# Patient Record
Sex: MALE | Race: WHITE | NOT HISPANIC OR LATINO | URBAN - METROPOLITAN AREA
[De-identification: names, ages, dates, MRNs, and addresses within clinical notes are randomized per-mention and may not be internally consistent; named-entity substitution may affect disease eponyms.]

---

## 2017-05-30 ENCOUNTER — OUTPATIENT (OUTPATIENT)
Dept: OUTPATIENT SERVICES | Facility: HOSPITAL | Age: 52
LOS: 1 days | End: 2017-05-30
Payer: COMMERCIAL

## 2017-05-30 ENCOUNTER — APPOINTMENT (OUTPATIENT)
Dept: RADIOLOGY | Facility: HOSPITAL | Age: 52
End: 2017-05-30

## 2017-05-30 PROCEDURE — 71046 X-RAY EXAM CHEST 2 VIEWS: CPT

## 2017-08-20 ENCOUNTER — EMERGENCY (EMERGENCY)
Facility: HOSPITAL | Age: 52
LOS: 1 days | Discharge: HOME | End: 2017-08-20
Admitting: EMERGENCY MEDICINE
Payer: COMMERCIAL

## 2017-08-20 PROCEDURE — 99283 EMERGENCY DEPT VISIT LOW MDM: CPT

## 2017-08-20 PROCEDURE — 73630 X-RAY EXAM OF FOOT: CPT | Mod: 26,LT

## 2017-08-20 PROCEDURE — 73630 X-RAY EXAM OF FOOT: CPT

## 2017-08-20 PROCEDURE — 99283 EMERGENCY DEPT VISIT LOW MDM: CPT | Mod: 25

## 2018-05-12 ENCOUNTER — EMERGENCY (EMERGENCY)
Facility: HOSPITAL | Age: 53
LOS: 1 days | Discharge: HOME | End: 2018-05-12
Admitting: EMERGENCY MEDICINE
Payer: COMMERCIAL

## 2018-05-12 PROCEDURE — 99283 EMERGENCY DEPT VISIT LOW MDM: CPT

## 2018-05-12 PROCEDURE — 96372 THER/PROPH/DIAG INJ SC/IM: CPT

## 2018-05-12 PROCEDURE — 72110 X-RAY EXAM L-2 SPINE 4/>VWS: CPT | Mod: 26

## 2018-05-12 PROCEDURE — 72110 X-RAY EXAM L-2 SPINE 4/>VWS: CPT

## 2018-05-12 PROCEDURE — 99283 EMERGENCY DEPT VISIT LOW MDM: CPT | Mod: 25

## 2018-05-12 PROCEDURE — 81003 URINALYSIS AUTO W/O SCOPE: CPT

## 2018-06-18 ENCOUNTER — APPOINTMENT (OUTPATIENT)
Dept: MRI IMAGING | Facility: HOSPITAL | Age: 53
End: 2018-06-18
Payer: COMMERCIAL

## 2018-06-18 ENCOUNTER — OUTPATIENT (OUTPATIENT)
Dept: OUTPATIENT SERVICES | Facility: HOSPITAL | Age: 53
LOS: 1 days | End: 2018-06-18
Payer: COMMERCIAL

## 2018-06-18 DIAGNOSIS — Z00.8 ENCOUNTER FOR OTHER GENERAL EXAMINATION: ICD-10-CM

## 2018-06-18 PROCEDURE — 70553 MRI BRAIN STEM W/O & W/DYE: CPT

## 2018-06-18 PROCEDURE — 70553 MRI BRAIN STEM W/O & W/DYE: CPT | Mod: 26

## 2018-06-18 PROCEDURE — A9579: CPT

## 2018-07-03 ENCOUNTER — APPOINTMENT (OUTPATIENT)
Dept: CARDIOLOGY | Facility: CLINIC | Age: 53
End: 2018-07-03
Payer: COMMERCIAL

## 2018-07-03 ENCOUNTER — NON-APPOINTMENT (OUTPATIENT)
Age: 53
End: 2018-07-03

## 2018-07-03 VITALS
HEIGHT: 77 IN | BODY MASS INDEX: 37.19 KG/M2 | OXYGEN SATURATION: 98 % | SYSTOLIC BLOOD PRESSURE: 130 MMHG | WEIGHT: 315 LBS | DIASTOLIC BLOOD PRESSURE: 85 MMHG | HEART RATE: 58 BPM | RESPIRATION RATE: 16 BRPM

## 2018-07-03 VITALS — DIASTOLIC BLOOD PRESSURE: 76 MMHG | SYSTOLIC BLOOD PRESSURE: 110 MMHG | HEART RATE: 64 BPM

## 2018-07-03 DIAGNOSIS — Z78.9 OTHER SPECIFIED HEALTH STATUS: ICD-10-CM

## 2018-07-03 PROCEDURE — 99244 OFF/OP CNSLTJ NEW/EST MOD 40: CPT

## 2018-07-03 PROCEDURE — 93000 ELECTROCARDIOGRAM COMPLETE: CPT

## 2018-07-03 PROCEDURE — 93306 TTE W/DOPPLER COMPLETE: CPT

## 2018-07-18 ENCOUNTER — FORM ENCOUNTER (OUTPATIENT)
Age: 53
End: 2018-07-18

## 2018-07-19 ENCOUNTER — OUTPATIENT (OUTPATIENT)
Dept: OUTPATIENT SERVICES | Facility: HOSPITAL | Age: 53
LOS: 1 days | End: 2018-07-19
Payer: COMMERCIAL

## 2018-07-19 ENCOUNTER — APPOINTMENT (OUTPATIENT)
Dept: CARDIOLOGY | Facility: CLINIC | Age: 53
End: 2018-07-19

## 2018-07-19 DIAGNOSIS — R07.9 CHEST PAIN, UNSPECIFIED: ICD-10-CM

## 2018-07-19 DIAGNOSIS — I49.3 VENTRICULAR PREMATURE DEPOLARIZATION: ICD-10-CM

## 2018-07-19 PROCEDURE — 75574 CT ANGIO HRT W/3D IMAGE: CPT

## 2018-07-19 PROCEDURE — 75574 CT ANGIO HRT W/3D IMAGE: CPT | Mod: 26

## 2019-05-06 ENCOUNTER — OUTPATIENT (OUTPATIENT)
Dept: OUTPATIENT SERVICES | Facility: HOSPITAL | Age: 54
LOS: 1 days | End: 2019-05-06
Payer: COMMERCIAL

## 2019-05-06 ENCOUNTER — APPOINTMENT (OUTPATIENT)
Dept: ULTRASOUND IMAGING | Facility: HOSPITAL | Age: 54
End: 2019-05-06
Payer: COMMERCIAL

## 2019-05-06 DIAGNOSIS — Z00.8 ENCOUNTER FOR OTHER GENERAL EXAMINATION: ICD-10-CM

## 2019-05-06 PROCEDURE — 76705 ECHO EXAM OF ABDOMEN: CPT

## 2019-05-06 PROCEDURE — 76705 ECHO EXAM OF ABDOMEN: CPT | Mod: 26

## 2019-05-10 ENCOUNTER — APPOINTMENT (OUTPATIENT)
Dept: CT IMAGING | Facility: HOSPITAL | Age: 54
End: 2019-05-10

## 2019-05-24 ENCOUNTER — APPOINTMENT (OUTPATIENT)
Dept: RADIOLOGY | Facility: HOSPITAL | Age: 54
End: 2019-05-24

## 2020-11-23 ENCOUNTER — APPOINTMENT (OUTPATIENT)
Dept: SURGERY | Facility: CLINIC | Age: 55
End: 2020-11-23
Payer: COMMERCIAL

## 2020-11-23 VITALS — WEIGHT: 300 LBS | HEIGHT: 77 IN | BODY MASS INDEX: 35.42 KG/M2

## 2020-11-23 PROCEDURE — 99203 OFFICE O/P NEW LOW 30 MIN: CPT

## 2020-11-23 NOTE — ASSESSMENT
[FreeTextEntry1] : Long discussion regarding all options and risks\par Scheduled for surgical repair on 12/3/20

## 2020-11-23 NOTE — PHYSICAL EXAM
[Normal Breath Sounds] : Normal breath sounds [Normal Heart Sounds] : normal heart sounds [Normal Rate and Rhythm] : normal rate and rhythm [Abdominal Masses] : No abdominal masses [Abdomen Tenderness] : ~T ~M No abdominal tenderness [No Rash or Lesion] : No rash or lesion [de-identified] : nl [de-identified] : nl [de-identified] : nonreducible supraumbilical hernia - 10cm. [de-identified] : nl

## 2020-11-23 NOTE — HISTORY OF PRESENT ILLNESS
[de-identified] : This 55 year old man developed a bulge in the upper abdomen eighteen months ago after coughing and vomiting for several days. The bulge has not changed in size and is only occasionally uncomfortable. He denies fever or chills. The patient has been eating well and passing stool.

## 2020-11-23 NOTE — REVIEW OF SYSTEMS
[Heart Rate Is Slow] : the heart rate was not slow [Chest Pain] : no chest pain [Shortness Of Breath] : no shortness of breath [Negative] : Genitourinary

## 2020-11-30 ENCOUNTER — OUTPATIENT (OUTPATIENT)
Dept: OUTPATIENT SERVICES | Facility: HOSPITAL | Age: 55
LOS: 1 days | End: 2020-11-30
Payer: COMMERCIAL

## 2020-11-30 VITALS
DIASTOLIC BLOOD PRESSURE: 75 MMHG | HEIGHT: 76 IN | RESPIRATION RATE: 20 BRPM | SYSTOLIC BLOOD PRESSURE: 124 MMHG | TEMPERATURE: 98 F | WEIGHT: 315 LBS | HEART RATE: 57 BPM | OXYGEN SATURATION: 96 %

## 2020-11-30 DIAGNOSIS — Z01.818 ENCOUNTER FOR OTHER PREPROCEDURAL EXAMINATION: ICD-10-CM

## 2020-11-30 DIAGNOSIS — Z98.890 OTHER SPECIFIED POSTPROCEDURAL STATES: Chronic | ICD-10-CM

## 2020-11-30 DIAGNOSIS — K43.9 VENTRAL HERNIA WITHOUT OBSTRUCTION OR GANGRENE: ICD-10-CM

## 2020-11-30 LAB
ANION GAP SERPL CALC-SCNC: 8 MMOL/L — SIGNIFICANT CHANGE UP (ref 5–17)
BUN SERPL-MCNC: 17 MG/DL — SIGNIFICANT CHANGE UP (ref 7–23)
CALCIUM SERPL-MCNC: 8.8 MG/DL — SIGNIFICANT CHANGE UP (ref 8.4–10.5)
CHLORIDE SERPL-SCNC: 105 MMOL/L — SIGNIFICANT CHANGE UP (ref 96–108)
CO2 SERPL-SCNC: 27 MMOL/L — SIGNIFICANT CHANGE UP (ref 22–31)
CREAT SERPL-MCNC: 1.3 MG/DL — SIGNIFICANT CHANGE UP (ref 0.5–1.3)
GLUCOSE SERPL-MCNC: 107 MG/DL — HIGH (ref 70–99)
POTASSIUM SERPL-MCNC: 4.2 MMOL/L — SIGNIFICANT CHANGE UP (ref 3.5–5.3)
POTASSIUM SERPL-SCNC: 4.2 MMOL/L — SIGNIFICANT CHANGE UP (ref 3.5–5.3)
SARS-COV-2 RNA SPEC QL NAA+PROBE: SIGNIFICANT CHANGE UP
SODIUM SERPL-SCNC: 140 MMOL/L — SIGNIFICANT CHANGE UP (ref 135–145)

## 2020-11-30 PROCEDURE — 93005 ELECTROCARDIOGRAM TRACING: CPT

## 2020-11-30 PROCEDURE — 93010 ELECTROCARDIOGRAM REPORT: CPT | Mod: NC

## 2020-11-30 PROCEDURE — G0463: CPT

## 2020-11-30 PROCEDURE — U0003: CPT

## 2020-11-30 PROCEDURE — 80048 BASIC METABOLIC PNL TOTAL CA: CPT

## 2020-11-30 PROCEDURE — 36415 COLL VENOUS BLD VENIPUNCTURE: CPT

## 2020-11-30 NOTE — H&P PST ADULT - NSICDXPASTMEDICALHX_GEN_ALL_CORE_FT
PAST MEDICAL HISTORY:  At risk for sleep apnea     Class 2 obesity with body mass index (BMI) of 38.0 to 38.9 in adult     Insomnia     Tinnitus      PAST MEDICAL HISTORY:  At risk for sleep apnea     Class 2 obesity with body mass index (BMI) of 38.0 to 38.9 in adult     GERD (gastroesophageal reflux disease)     Insomnia     Tinnitus     Ventral hernia

## 2020-11-30 NOTE — H&P PST ADULT - NSICDXFAMILYHX_GEN_ALL_CORE_FT
FAMILY HISTORY:  Father  Still living? No  Family history of heart disease, Age at diagnosis: Age Unknown    Mother  Still living? No  Family history of lymphoma, Age at diagnosis: Age Unknown

## 2020-11-30 NOTE — H&P PST ADULT - NEGATIVE ENMT SYMPTOMS
no recurrent cold sores/no abnormal taste sensation/no nasal congestion/no nasal obstruction/no vertigo/no hearing difficulty/no sinus symptoms/no nasal discharge/no post-nasal discharge/no nose bleeds/no gum bleeding/no dry mouth/no throat pain/no dysphagia

## 2020-11-30 NOTE — H&P PST ADULT - ASSESSMENT
56 yo male is scheduled for ventral hernia repair at [facility] on 12/3/2020

## 2020-12-03 ENCOUNTER — OUTPATIENT (OUTPATIENT)
Dept: OUTPATIENT SERVICES | Facility: HOSPITAL | Age: 55
LOS: 1 days | End: 2020-12-03
Payer: COMMERCIAL

## 2020-12-03 VITALS
SYSTOLIC BLOOD PRESSURE: 155 MMHG | DIASTOLIC BLOOD PRESSURE: 90 MMHG | OXYGEN SATURATION: 95 % | RESPIRATION RATE: 16 BRPM | HEART RATE: 56 BPM | TEMPERATURE: 97 F

## 2020-12-03 VITALS
SYSTOLIC BLOOD PRESSURE: 148 MMHG | WEIGHT: 315 LBS | TEMPERATURE: 98 F | HEART RATE: 58 BPM | RESPIRATION RATE: 15 BRPM | HEIGHT: 76 IN | OXYGEN SATURATION: 97 % | DIASTOLIC BLOOD PRESSURE: 84 MMHG

## 2020-12-03 DIAGNOSIS — K43.9 VENTRAL HERNIA WITHOUT OBSTRUCTION OR GANGRENE: ICD-10-CM

## 2020-12-03 DIAGNOSIS — Z98.890 OTHER SPECIFIED POSTPROCEDURAL STATES: Chronic | ICD-10-CM

## 2020-12-03 PROCEDURE — 99215 OFFICE O/P EST HI 40 MIN: CPT

## 2020-12-03 PROCEDURE — 93005 ELECTROCARDIOGRAM TRACING: CPT

## 2020-12-03 PROCEDURE — 93010 ELECTROCARDIOGRAM REPORT: CPT

## 2020-12-03 PROCEDURE — 49560: CPT | Mod: 73

## 2020-12-03 RX ORDER — HYDROMORPHONE HYDROCHLORIDE 2 MG/ML
0.5 INJECTION INTRAMUSCULAR; INTRAVENOUS; SUBCUTANEOUS
Refills: 0 | Status: DISCONTINUED | OUTPATIENT
Start: 2020-12-03 | End: 2020-12-03

## 2020-12-03 RX ORDER — SODIUM CHLORIDE 9 MG/ML
1000 INJECTION, SOLUTION INTRAVENOUS
Refills: 0 | Status: DISCONTINUED | OUTPATIENT
Start: 2020-12-03 | End: 2020-12-03

## 2020-12-03 RX ORDER — ONDANSETRON 8 MG/1
4 TABLET, FILM COATED ORAL ONCE
Refills: 0 | Status: DISCONTINUED | OUTPATIENT
Start: 2020-12-03 | End: 2020-12-03

## 2020-12-03 RX ADMIN — SODIUM CHLORIDE 50 MILLILITER(S): 9 INJECTION, SOLUTION INTRAVENOUS at 09:47

## 2020-12-03 NOTE — CONSULT NOTE ADULT - ATTENDING COMMENTS
vpc's  unclear significance . most likely chronic in nature. however high density now noted with bigeminy. would consider acceleration of cad or even effects of etoh. surgery is elective and deferred and patient is referred to dr teran for formal evaluation.

## 2020-12-03 NOTE — ASU PATIENT PROFILE, ADULT - PMH
At risk for sleep apnea    Class 2 obesity with body mass index (BMI) of 38.0 to 38.9 in adult    GERD (gastroesophageal reflux disease)    Tinnitus    Ventral hernia

## 2020-12-03 NOTE — CONSULT NOTE ADULT - SUBJECTIVE AND OBJECTIVE BOX
Chief Complaint:     54 yo male presents with complaint of ventral hernia  He is scheduled for repair of ventral hernia, possible mesh on 13/3/2020 with Dr Knox at [facility]. Giuseppe was previously seen by dr marcelino and underwent a cardiac CTA demonstrating some lad disease. he was treated medically. on monitor he is noted to have ventricular bigeminy and the ECG shows ventricular coupling. he had some alchoh yesterday.     HPI:    PMH:   Ventral hernia  Insomnia  GERD (gastroesophageal reflux disease)  Class 2 obesity with body mass index (BMI) of 38.0 to 38.9 in adult  At risk for sleep apnea  Insomnia  Tinnitus    PSH:   History of nasal surgery      Family History:  FAMILY HISTORY:  Family history of lymphoma (Mother)    Family history of heart disease (Father)        Social History:  Smoking:  Alcohol:  Drugs:    Allergies:  No Known Allergies      Medications:      REVIEW OF SYSTEMS:  CONSTITUTIONAL: No fever, weight loss, or fatigue  EYES: No eye pain, visual disturbances, or discharge  ENMT:  No difficulty hearing, tinnitus, vertigo; No sinus or throat pain  NECK: No pain or stiffness  BREASTS: No pain, masses, or nipple discharge  RESPIRATORY: No cough, wheezing, chills or hemoptysis; No shortness of breath  CARDIOVASCULAR: No chest pain, palpitations, dizziness, or leg swelling  GASTROINTESTINAL: No abdominal or epigastric pain. No nausea, vomiting, or hematemesis; No diarrhea or constipation. No melena or hematochezia.  GENITOURINARY: No dysuria, frequency, hematuria, or incontinence  NEUROLOGICAL: No headaches, memory loss, loss of strength, numbness, or tremors  SKIN: No itching, burning, rashes, or lesions   LYMPH NODES: No enlarged glands  ENDOCRINE: No heat or cold intolerance; No hair loss  MUSCULOSKELETAL: No joint pain or swelling; No muscle, back, or extremity pain  PSYCHIATRIC: No depression, anxiety, mood swings, or difficulty sleeping  HEME/LYMPH: No easy bruising, or bleeding gums  ALLERY AND IMMUNOLOGIC: No hives or eczema    Physical Exam:  T(C): 36.3 (12-03-20 @ 15:03), Max: 36.8 (12-03-20 @ 14:30)  HR: 56 (12-03-20 @ 15:03) (56 - 84)  BP: 155/90 (12-03-20 @ 15:03) (142/79 - 155/90)  RR: 16 (12-03-20 @ 15:03) (15 - 16)  SpO2: 95% (12-03-20 @ 15:03) (95% - 97%)  Wt(kg): --    GENERAL: NAD, well-groomed, well-developed  HEAD:  Atraumatic, Normocephalic  EYES: EOMI, conjunctiva and sclera clear  ENT: Moist mucous membranes,  NECK: Supple, No JVD, no bruits  CHEST/LUNG: Clear to percussion bilaterally; No rales, rhonchi, wheezing, or rubs  HEART: Regular rate and rhythm; No murmurs, rubs, or gallops PMI non displaced.  ABDOMEN: Soft, Nontender, Nondistended; Bowel sounds present  EXTREMITIES:  2+ Peripheral Pulses, No clubbing, cyanosis, or edema  SKIN: No rashes or lesions  NERVOUS SYSTEM:  Cranial Nerves II-XII intact     Cardiovascular Diagnostic Testing:  ECG:    e< from: 12 Lead ECG (11.30.20 @ 08:59) >  Diagnosis Line Sinus rhythm with frequent and consecutive premature ventricular complexes  polymorphic ventricular beats with ventricular coupling  Abnormal ECG  No previous ECGs available    Confirmed by TAISHA DIAZ MD ([id number]) on 12/1/2020 8:59:57 AM    < end of copied text >      ECHO:        Labs:    Imaging:    cardiac cta 7/19/18 up to .5 diag and lad dz and mild rca disease

## 2020-12-03 NOTE — ASU DISCHARGE PLAN (ADULT/PEDIATRIC) - CARE PROVIDER_API CALL
Matthew Knox  COLON/RECTAL SURGERY  [address], [address]  [place], [place] [id number]  Phone: [phone]  Fax: [phone]  Follow Up Time: 1 month    Alex Taylor  CARDIOLOGY  [address], [address]  [place] [address]  Phone: [phone]  Fax: [phone]  Follow Up Time: 1 week

## 2020-12-03 NOTE — ASU DISCHARGE PLAN (ADULT/PEDIATRIC) - PROVIDER TOKENS
PROVIDER:[TOKEN:[[id number]],FOLLOWUP:[1 month]],PROVIDER:[TOKEN:[[id number]],FOLLOWUP:[1 week]]

## 2020-12-03 NOTE — ASU DISCHARGE PLAN (ADULT/PEDIATRIC) - ASU DC SPECIAL INSTRUCTIONSFT
Follow-up with Cardiology (Dr. Mcghee) in 1-2 weeks; please call office for appointment.  Please call Dr. Knox after Cardiology evaluation and clearance to schedule hernia surgery,

## 2020-12-03 NOTE — ASU PREOP CHECKLIST - LAST TOOK
Impression: Puckering of macula, right eye: H35.371. Lamellar hole  Plan: OCT ordered and performed today. Discussed diagnosis with patient. The clinical exam was consistent with Epiretinal membrane. The diagnosis and natural history and prognosis of Epiretinal membrane, as well as the risks and benefits with Vitrectomy with membrane peeling were discussed. Given the current visual acuity  the patient elects to observe for now. 10:30pm water/solids

## 2020-12-04 PROBLEM — K43.9 VENTRAL HERNIA WITHOUT OBSTRUCTION OR GANGRENE: Chronic | Status: ACTIVE | Noted: 2020-11-30

## 2020-12-04 PROBLEM — E66.9 OBESITY, UNSPECIFIED: Chronic | Status: ACTIVE | Noted: 2020-11-30

## 2020-12-04 PROBLEM — H93.19 TINNITUS, UNSPECIFIED EAR: Chronic | Status: ACTIVE | Noted: 2020-11-30

## 2020-12-04 PROBLEM — Z91.89 OTHER SPECIFIED PERSONAL RISK FACTORS, NOT ELSEWHERE CLASSIFIED: Chronic | Status: ACTIVE | Noted: 2020-11-30

## 2020-12-04 PROBLEM — K21.9 GASTRO-ESOPHAGEAL REFLUX DISEASE WITHOUT ESOPHAGITIS: Chronic | Status: ACTIVE | Noted: 2020-11-30

## 2020-12-08 ENCOUNTER — NON-APPOINTMENT (OUTPATIENT)
Age: 55
End: 2020-12-08

## 2020-12-08 ENCOUNTER — APPOINTMENT (OUTPATIENT)
Dept: CARDIOLOGY | Facility: CLINIC | Age: 55
End: 2020-12-08
Payer: COMMERCIAL

## 2020-12-08 VITALS
HEIGHT: 77 IN | DIASTOLIC BLOOD PRESSURE: 78 MMHG | TEMPERATURE: 97.2 F | RESPIRATION RATE: 126 BRPM | WEIGHT: 315 LBS | OXYGEN SATURATION: 97 % | BODY MASS INDEX: 37.19 KG/M2 | SYSTOLIC BLOOD PRESSURE: 131 MMHG | HEART RATE: 55 BPM

## 2020-12-08 VITALS — HEART RATE: 70 BPM | DIASTOLIC BLOOD PRESSURE: 70 MMHG | SYSTOLIC BLOOD PRESSURE: 110 MMHG

## 2020-12-08 DIAGNOSIS — H93.19 TINNITUS, UNSPECIFIED EAR: ICD-10-CM

## 2020-12-08 PROBLEM — G47.00 INSOMNIA, UNSPECIFIED: Chronic | Status: INACTIVE | Noted: 2020-11-30 | Resolved: 2020-12-03

## 2020-12-08 PROCEDURE — 99072 ADDL SUPL MATRL&STAF TM PHE: CPT

## 2020-12-08 PROCEDURE — 93000 ELECTROCARDIOGRAM COMPLETE: CPT

## 2020-12-08 PROCEDURE — 99215 OFFICE O/P EST HI 40 MIN: CPT

## 2020-12-09 PROBLEM — H93.19 TINNITUS: Status: ACTIVE | Noted: 2018-07-03

## 2020-12-09 NOTE — PHYSICAL EXAM
[General Appearance - Well Developed] : well developed [Normal Appearance] : normal appearance [Well Groomed] : well groomed [General Appearance - Well Nourished] : well nourished [No Deformities] : no deformities [General Appearance - In No Acute Distress] : no acute distress [Normal Oral Mucosa] : normal oral mucosa [No Oral Pallor] : no oral pallor [No Oral Cyanosis] : no oral cyanosis [Normal Jugular Venous A Waves Present] : normal jugular venous A waves present [Normal Jugular Venous V Waves Present] : normal jugular venous V waves present [No Jugular Venous Connor A Waves] : no jugular venous connor A waves [Respiration, Rhythm And Depth] : normal respiratory rhythm and effort [Exaggerated Use Of Accessory Muscles For Inspiration] : no accessory muscle use [Auscultation Breath Sounds / Voice Sounds] : lungs were clear to auscultation bilaterally [Abdomen Soft] : soft [Abdomen Tenderness] : non-tender [Abdomen Mass (___ Cm)] : no abdominal mass palpated [Nail Clubbing] : no clubbing of the fingernails [Cyanosis, Localized] : no localized cyanosis [Petechial Hemorrhages (___cm)] : no petechial hemorrhages [Skin Color & Pigmentation] : normal skin color and pigmentation [] : no rash [No Venous Stasis] : no venous stasis [Skin Lesions] : no skin lesions [No Skin Ulcers] : no skin ulcer [No Xanthoma] : no  xanthoma was observed [Oriented To Time, Place, And Person] : oriented to person, place, and time [Affect] : the affect was normal [Mood] : the mood was normal [No Anxiety] : not feeling anxious [5th Left ICS - MCL] : palpated at the 5th LICS in the midclavicular line [Normal] : normal [Normal Rate] : normal [Premature Beats] : regular with premature beats

## 2020-12-09 NOTE — REASON FOR VISIT
[Follow-Up - Clinic] : a clinic follow-up of [FreeTextEntry1] : Patient returns for reevaluation. He recently went for a ventral hernia surgery. He was taken off the table prior to the surgery due to frequent PVCs. Anesthesia would not allow him to undergo surgery without a repeat cardiac evaluation.His chief complaint is of tinnitus and he is concerned whether or not this may be related to his PVCs. Prior workup included a cardiac CAT scan which revealed nonobstructive disease this was done 2 years ago.

## 2020-12-23 ENCOUNTER — APPOINTMENT (OUTPATIENT)
Dept: CARDIOLOGY | Facility: CLINIC | Age: 55
End: 2020-12-23
Payer: COMMERCIAL

## 2020-12-23 PROCEDURE — 93015 CV STRESS TEST SUPVJ I&R: CPT

## 2020-12-23 PROCEDURE — 99072 ADDL SUPL MATRL&STAF TM PHE: CPT

## 2020-12-23 PROCEDURE — 93306 TTE W/DOPPLER COMPLETE: CPT

## 2021-01-04 ENCOUNTER — OUTPATIENT (OUTPATIENT)
Dept: OUTPATIENT SERVICES | Facility: HOSPITAL | Age: 56
LOS: 1 days | End: 2021-01-04
Payer: COMMERCIAL

## 2021-01-04 VITALS
OXYGEN SATURATION: 98 % | HEIGHT: 75 IN | HEART RATE: 72 BPM | TEMPERATURE: 98 F | RESPIRATION RATE: 16 BRPM | SYSTOLIC BLOOD PRESSURE: 133 MMHG | WEIGHT: 315 LBS | DIASTOLIC BLOOD PRESSURE: 75 MMHG

## 2021-01-04 DIAGNOSIS — Z98.890 OTHER SPECIFIED POSTPROCEDURAL STATES: Chronic | ICD-10-CM

## 2021-01-04 DIAGNOSIS — F32.9 MAJOR DEPRESSIVE DISORDER, SINGLE EPISODE, UNSPECIFIED: ICD-10-CM

## 2021-01-04 DIAGNOSIS — K43.9 VENTRAL HERNIA WITHOUT OBSTRUCTION OR GANGRENE: ICD-10-CM

## 2021-01-04 DIAGNOSIS — E66.9 OBESITY, UNSPECIFIED: ICD-10-CM

## 2021-01-04 DIAGNOSIS — I49.3 VENTRICULAR PREMATURE DEPOLARIZATION: ICD-10-CM

## 2021-01-04 DIAGNOSIS — K21.9 GASTRO-ESOPHAGEAL REFLUX DISEASE WITHOUT ESOPHAGITIS: ICD-10-CM

## 2021-01-04 DIAGNOSIS — Z01.818 ENCOUNTER FOR OTHER PREPROCEDURAL EXAMINATION: ICD-10-CM

## 2021-01-04 LAB — SARS-COV-2 RNA SPEC QL NAA+PROBE: SIGNIFICANT CHANGE UP

## 2021-01-04 PROCEDURE — U0003: CPT

## 2021-01-04 PROCEDURE — U0005: CPT

## 2021-01-04 PROCEDURE — G0463: CPT

## 2021-01-04 PROCEDURE — 36415 COLL VENOUS BLD VENIPUNCTURE: CPT

## 2021-01-04 RX ORDER — ACETAMINOPHEN 500 MG
2 TABLET ORAL
Qty: 0 | Refills: 0 | DISCHARGE

## 2021-01-04 RX ORDER — NIACIN 50 MG
1 TABLET ORAL
Qty: 0 | Refills: 0 | DISCHARGE

## 2021-01-04 RX ORDER — SODIUM CHLORIDE 9 MG/ML
1000 INJECTION, SOLUTION INTRAVENOUS
Refills: 0 | Status: DISCONTINUED | OUTPATIENT
Start: 2021-01-07 | End: 2021-01-21

## 2021-01-04 NOTE — H&P PST ADULT - NSICDXPASTMEDICALHX_GEN_ALL_CORE_FT
PAST MEDICAL HISTORY:  At risk for sleep apnea     Cardiac arrhythmia     Class 2 obesity with body mass index (BMI) of 38.0 to 38.9 in adult     GERD (gastroesophageal reflux disease)     Tinnitus     Ventral hernia      PAST MEDICAL HISTORY:  At risk for sleep apnea     Cardiac arrhythmia     Class 2 obesity with body mass index (BMI) of 38.0 to 38.9 in adult     Frequent PVCs     GERD (gastroesophageal reflux disease)     Tinnitus     Ventral hernia

## 2021-01-04 NOTE — H&P PST ADULT - NSANTHOSAYNRD_GEN_A_CORE
neck 20inches/No. SAMIRA screening performed.  STOP BANG Legend: 0-2 = LOW Risk; 3-4 = INTERMEDIATE Risk; 5-8 = HIGH Risk

## 2021-01-04 NOTE — H&P PST ADULT - NSICDXPROBLEM_GEN_ALL_CORE_FT
PROBLEM DIAGNOSES  Problem: Ventral hernia without obstruction or gangrene  Assessment and Plan: Pre-op instructions given. Pt verbalized understanding  Chlorhexidine wash instructiosn given  Echo, EKg, Stress test and last cardio visit in chart  Pending; Covidpcr results + Cardiology clearance     Problem: Frequent PVCs  Assessment and Plan: noted on EKG    Problem: Obesity  Assessment and Plan: SAMIRA precaution - stop bang 5  BMI 40.8 wt 326.2 OR booking made aware    Problem: GERD (gastroesophageal reflux disease)  Assessment and Plan: Pt instructed to take meds    Problem: Depression  Assessment and Plan: Pt instructed to take meds

## 2021-01-04 NOTE — H&P PST ADULT - NEGATIVE PSYCHIATRIC SYMPTOMS
no suicidal ideation/no anxiety/no insomnia/no memory loss/no paranoia/no mood swings/no agitation/no auditory hallucinations/no hyperactivity

## 2021-01-04 NOTE — H&P PST ADULT - VENOUS THROMBOEMBOLISM BMI
LOC: The patient is awake, alert and aware of environment with an appropriate affect, the patient is oriented x 3 and speaking appropriately.  APPEARANCE: Patient resting comfortably and in no acute distress, patient is clean and well groomed, patient's clothing is properly fastened.  SKIN: The skin is warm and dry, patient has normal skin turgor and moist mucus membranes, skin intact, no breakdown or brusing noted.  MUSKULOSKELETAL: Patient moving all extremities well, no obvious swelling or deformities noted.  RESPIRATORY: Airway is open and patent, respirations are spontaneous, patient has a normal effort and rate. Breath sounds are clear and equal bilaterally.  CARDIAC: Normal heart sounds. No peripheral edema.  ABDOMEN: Soft and non tender to palpation, no distention noted.   NEURO: No neuro deficits, hand grasp equal, no drift noted, no facial droop noted. Speech is clear.    Pt denies fever, night sweats or chills.   
(1) obesity (BMI greater than 25)

## 2021-01-05 ENCOUNTER — NON-APPOINTMENT (OUTPATIENT)
Age: 56
End: 2021-01-05

## 2021-01-05 ENCOUNTER — APPOINTMENT (OUTPATIENT)
Dept: CARDIOLOGY | Facility: CLINIC | Age: 56
End: 2021-01-05
Payer: COMMERCIAL

## 2021-01-05 VITALS
DIASTOLIC BLOOD PRESSURE: 83 MMHG | TEMPERATURE: 97.6 F | SYSTOLIC BLOOD PRESSURE: 125 MMHG | HEIGHT: 77 IN | WEIGHT: 315 LBS | RESPIRATION RATE: 42 BRPM | BODY MASS INDEX: 37.19 KG/M2 | OXYGEN SATURATION: 97 %

## 2021-01-05 VITALS — HEART RATE: 46 BPM | DIASTOLIC BLOOD PRESSURE: 72 MMHG | SYSTOLIC BLOOD PRESSURE: 124 MMHG

## 2021-01-05 PROCEDURE — 93000 ELECTROCARDIOGRAM COMPLETE: CPT

## 2021-01-05 PROCEDURE — 99214 OFFICE O/P EST MOD 30 MIN: CPT

## 2021-01-05 PROCEDURE — 99072 ADDL SUPL MATRL&STAF TM PHE: CPT

## 2021-01-05 NOTE — REASON FOR VISIT
[Consultation] : a consultation regarding [FreeTextEntry1] : Patient presents for cardiac clearance for hernia surgery. He originally was scheduled for several weeks ago but on presentation with frequent PVCs the case was canceled. The patient has an over 30 year history of premature ventricular contractions. They have always been asymptomatic. He denies chest discomfort shortness of breath palpitations dizziness or syncope. On December 23 he underwent exercise stress testing. At the time of presentation he was in bigeminy. However he completed 9 minutes of a Kit protocol with no symptoms and no ST changes indicative of ischemia. This was a normal test. Echocardiography revealed normal left ventricular systolic function. There was no evidence of significant valvular stenosis or insufficiency.

## 2021-01-05 NOTE — ASSESSMENT
[FreeTextEntry1] : In summary, the patient is a 55-year-old man with frequent PVCs including frequent episodes of sustained bigeminy. He has no structural heart disease as demonstrated by a normal echocardiogram and a normal exercise stress test.\par \par Frequent PVCs are to be expected in this patient. He has been placed on beta blocker which he is tolerating well and has been on this since December 23.\par \par There is no cardiac contraindication to the proposed procedure

## 2021-01-06 ENCOUNTER — TRANSCRIPTION ENCOUNTER (OUTPATIENT)
Age: 56
End: 2021-01-06

## 2021-01-07 ENCOUNTER — RESULT REVIEW (OUTPATIENT)
Age: 56
End: 2021-01-07

## 2021-01-07 ENCOUNTER — OUTPATIENT (OUTPATIENT)
Dept: OUTPATIENT SERVICES | Facility: HOSPITAL | Age: 56
LOS: 1 days | End: 2021-01-07
Payer: COMMERCIAL

## 2021-01-07 VITALS
OXYGEN SATURATION: 97 % | WEIGHT: 315 LBS | TEMPERATURE: 97 F | HEART RATE: 64 BPM | HEIGHT: 75 IN | SYSTOLIC BLOOD PRESSURE: 150 MMHG | DIASTOLIC BLOOD PRESSURE: 70 MMHG | RESPIRATION RATE: 16 BRPM

## 2021-01-07 VITALS
HEART RATE: 60 BPM | DIASTOLIC BLOOD PRESSURE: 85 MMHG | TEMPERATURE: 98 F | OXYGEN SATURATION: 96 % | RESPIRATION RATE: 17 BRPM | SYSTOLIC BLOOD PRESSURE: 136 MMHG

## 2021-01-07 DIAGNOSIS — K43.9 VENTRAL HERNIA WITHOUT OBSTRUCTION OR GANGRENE: ICD-10-CM

## 2021-01-07 DIAGNOSIS — Z98.890 OTHER SPECIFIED POSTPROCEDURAL STATES: Chronic | ICD-10-CM

## 2021-01-07 PROCEDURE — 88302 TISSUE EXAM BY PATHOLOGIST: CPT

## 2021-01-07 PROCEDURE — 49561: CPT

## 2021-01-07 PROCEDURE — 88302 TISSUE EXAM BY PATHOLOGIST: CPT | Mod: 26

## 2021-01-07 PROCEDURE — 49585: CPT

## 2021-01-07 PROCEDURE — 49561: CPT | Mod: AS

## 2021-01-07 RX ORDER — OXYCODONE HYDROCHLORIDE 5 MG/1
10 TABLET ORAL ONCE
Refills: 0 | Status: DISCONTINUED | OUTPATIENT
Start: 2021-01-07 | End: 2021-01-07

## 2021-01-07 RX ORDER — HYDROMORPHONE HYDROCHLORIDE 2 MG/ML
0.5 INJECTION INTRAMUSCULAR; INTRAVENOUS; SUBCUTANEOUS
Refills: 0 | Status: DISCONTINUED | OUTPATIENT
Start: 2021-01-07 | End: 2021-01-07

## 2021-01-07 RX ORDER — SODIUM CHLORIDE 9 MG/ML
1000 INJECTION, SOLUTION INTRAVENOUS
Refills: 0 | Status: DISCONTINUED | OUTPATIENT
Start: 2021-01-07 | End: 2021-01-07

## 2021-01-07 RX ORDER — OXYCODONE HYDROCHLORIDE 5 MG/1
1 TABLET ORAL
Qty: 16 | Refills: 0
Start: 2021-01-07 | End: 2021-01-10

## 2021-01-07 RX ADMIN — HYDROMORPHONE HYDROCHLORIDE 0.5 MILLIGRAM(S): 2 INJECTION INTRAMUSCULAR; INTRAVENOUS; SUBCUTANEOUS at 17:30

## 2021-01-07 RX ADMIN — SODIUM CHLORIDE 50 MILLILITER(S): 9 INJECTION, SOLUTION INTRAVENOUS at 10:12

## 2021-01-07 NOTE — ASU DISCHARGE PLAN (ADULT/PEDIATRIC) - ASU DC SPECIAL INSTRUCTIONSFT
Keep dressing dry for 24 hours then may shower over steri strips.  Ice pack for comfort and swelling as needed   no lifting, pushing, pulling  Take oxycodone as prescribed for moderate pain, take only tylenol for mild pain  Take a stool softener or laxative while taking narcotics to avoid constipation   Follow up with Dr Knox in one week call office for appt. Keep dressing dry for 24 hours then may shower over steri strips.  Ice pack for comfort and swelling as needed   no lifting, pushing, pulling  Take oxycodone as prescribed for moderate pain, take only Tylenol for mild pain  Take a stool softener or laxative while taking narcotics to avoid constipation   Follow up with Dr Knox in one week call office for appt.

## 2021-01-07 NOTE — ASU DISCHARGE PLAN (ADULT/PEDIATRIC) - CALL YOUR DOCTOR IF YOU HAVE ANY OF THE FOLLOWING:
Bleeding that does not stop/Pain not relieved by Medications/Fever greater than (need to indicate Fahrenheit or Celsius)/Wound/Surgical Site with redness, or foul smelling discharge or pus/Inability to tolerate liquids or foods

## 2021-01-07 NOTE — ASU DISCHARGE PLAN (ADULT/PEDIATRIC) - CARE PROVIDER_API CALL
Matthew Knox  COLON/RECTAL SURGERY  [address] [facility] [address] [facility] [address] [facility] [facility], [address]  [place], [place] [id number]  Phone: [phone]  Fax: [phone]  Follow Up Time: 1 week

## 2021-01-07 NOTE — ASU PATIENT PROFILE, ADULT - PMH
At risk for sleep apnea    Cardiac arrhythmia    Class 2 obesity with body mass index (BMI) of 38.0 to 38.9 in adult    Frequent PVCs    GERD (gastroesophageal reflux disease)    Tinnitus    Ventral hernia

## 2021-01-11 LAB — SURGICAL PATHOLOGY STUDY: SIGNIFICANT CHANGE UP

## 2021-01-14 PROBLEM — I49.3 VENTRICULAR PREMATURE DEPOLARIZATION: Chronic | Status: ACTIVE | Noted: 2021-01-04

## 2021-01-14 PROBLEM — I49.9 CARDIAC ARRHYTHMIA, UNSPECIFIED: Chronic | Status: ACTIVE | Noted: 2021-01-04

## 2021-01-15 ENCOUNTER — APPOINTMENT (OUTPATIENT)
Dept: SURGERY | Facility: CLINIC | Age: 56
End: 2021-01-15
Payer: COMMERCIAL

## 2021-01-15 DIAGNOSIS — Z82.49 FAMILY HISTORY OF ISCHEMIC HEART DISEASE AND OTHER DISEASES OF THE CIRCULATORY SYSTEM: ICD-10-CM

## 2021-01-15 DIAGNOSIS — K43.9 VENTRAL HERNIA W/OUT OBSTRUCTION OR GANGRENE: ICD-10-CM

## 2021-01-15 DIAGNOSIS — Z80.7 FAMILY HISTORY OF OTHER MALIGNANT NEOPLASMS OF LYMPHOID, HEMATOPOIETIC AND RELATED TISSUES: ICD-10-CM

## 2021-01-15 PROCEDURE — 99024 POSTOP FOLLOW-UP VISIT: CPT

## 2021-01-15 NOTE — PHYSICAL EXAM
[Abdominal Masses] : No abdominal masses [Abdomen Tenderness] : ~T ~M No abdominal tenderness [de-identified] : wound healing well

## 2021-12-05 ENCOUNTER — TRANSCRIPTION ENCOUNTER (OUTPATIENT)
Age: 56
End: 2021-12-05

## 2021-12-24 ENCOUNTER — TRANSCRIPTION ENCOUNTER (OUTPATIENT)
Age: 56
End: 2021-12-24

## 2022-06-16 ENCOUNTER — APPOINTMENT (OUTPATIENT)
Dept: ULTRASOUND IMAGING | Facility: HOSPITAL | Age: 57
End: 2022-06-16

## 2022-08-01 ENCOUNTER — INPATIENT (INPATIENT)
Facility: HOSPITAL | Age: 57
LOS: 0 days | Discharge: HOME | DRG: 308 | End: 2022-08-02
Attending: HOSPITALIST | Admitting: HOSPITALIST
Payer: COMMERCIAL

## 2022-08-01 VITALS
DIASTOLIC BLOOD PRESSURE: 84 MMHG | OXYGEN SATURATION: 98 % | TEMPERATURE: 98 F | RESPIRATION RATE: 20 BRPM | WEIGHT: 315 LBS | SYSTOLIC BLOOD PRESSURE: 153 MMHG | HEIGHT: 77 IN | HEART RATE: 99 BPM

## 2022-08-01 DIAGNOSIS — Z98.890 OTHER SPECIFIED POSTPROCEDURAL STATES: Chronic | ICD-10-CM

## 2022-08-01 DIAGNOSIS — I50.9 HEART FAILURE, UNSPECIFIED: ICD-10-CM

## 2022-08-01 DIAGNOSIS — R07.9 CHEST PAIN, UNSPECIFIED: ICD-10-CM

## 2022-08-01 LAB
ALBUMIN SERPL ELPH-MCNC: 3.9 G/DL — SIGNIFICANT CHANGE UP (ref 3.3–5)
ALP SERPL-CCNC: 69 U/L — SIGNIFICANT CHANGE UP (ref 40–120)
ALT FLD-CCNC: 68 U/L — HIGH (ref 10–45)
ANION GAP SERPL CALC-SCNC: 8 MMOL/L — SIGNIFICANT CHANGE UP (ref 5–17)
AST SERPL-CCNC: 29 U/L — SIGNIFICANT CHANGE UP (ref 10–40)
BASOPHILS # BLD AUTO: 0.08 K/UL — SIGNIFICANT CHANGE UP (ref 0–0.2)
BASOPHILS NFR BLD AUTO: 0.8 % — SIGNIFICANT CHANGE UP (ref 0–2)
BILIRUB SERPL-MCNC: 0.9 MG/DL — SIGNIFICANT CHANGE UP (ref 0.2–1.2)
BUN SERPL-MCNC: 18 MG/DL — SIGNIFICANT CHANGE UP (ref 7–23)
CALCIUM SERPL-MCNC: 9 MG/DL — SIGNIFICANT CHANGE UP (ref 8.4–10.5)
CHLORIDE SERPL-SCNC: 101 MMOL/L — SIGNIFICANT CHANGE UP (ref 96–108)
CO2 SERPL-SCNC: 29 MMOL/L — SIGNIFICANT CHANGE UP (ref 22–31)
CREAT SERPL-MCNC: 1.32 MG/DL — HIGH (ref 0.5–1.3)
D DIMER BLD IA.RAPID-MCNC: 293 NG/ML DDU — HIGH
EGFR: 64 ML/MIN/1.73M2 — SIGNIFICANT CHANGE UP
EOSINOPHIL # BLD AUTO: 0.2 K/UL — SIGNIFICANT CHANGE UP (ref 0–0.5)
EOSINOPHIL NFR BLD AUTO: 2.1 % — SIGNIFICANT CHANGE UP (ref 0–6)
GLUCOSE SERPL-MCNC: 132 MG/DL — HIGH (ref 70–99)
HCT VFR BLD CALC: 49.9 % — SIGNIFICANT CHANGE UP (ref 39–50)
HGB BLD-MCNC: 17.3 G/DL — HIGH (ref 13–17)
IMM GRANULOCYTES NFR BLD AUTO: 0.3 % — SIGNIFICANT CHANGE UP (ref 0–1.5)
LYMPHOCYTES # BLD AUTO: 2.38 K/UL — SIGNIFICANT CHANGE UP (ref 1–3.3)
LYMPHOCYTES # BLD AUTO: 24.5 % — SIGNIFICANT CHANGE UP (ref 13–44)
MAGNESIUM SERPL-MCNC: 1.9 MG/DL — SIGNIFICANT CHANGE UP (ref 1.6–2.6)
MCHC RBC-ENTMCNC: 31.6 PG — SIGNIFICANT CHANGE UP (ref 27–34)
MCHC RBC-ENTMCNC: 34.7 GM/DL — SIGNIFICANT CHANGE UP (ref 32–36)
MCV RBC AUTO: 91.2 FL — SIGNIFICANT CHANGE UP (ref 80–100)
MONOCYTES # BLD AUTO: 0.51 K/UL — SIGNIFICANT CHANGE UP (ref 0–0.9)
MONOCYTES NFR BLD AUTO: 5.2 % — SIGNIFICANT CHANGE UP (ref 2–14)
NEUTROPHILS # BLD AUTO: 6.52 K/UL — SIGNIFICANT CHANGE UP (ref 1.8–7.4)
NEUTROPHILS NFR BLD AUTO: 67.1 % — SIGNIFICANT CHANGE UP (ref 43–77)
NRBC # BLD: 0 /100 WBCS — SIGNIFICANT CHANGE UP (ref 0–0)
NT-PROBNP SERPL-SCNC: 1558 PG/ML — HIGH (ref 0–300)
PLATELET # BLD AUTO: 227 K/UL — SIGNIFICANT CHANGE UP (ref 150–400)
POTASSIUM SERPL-MCNC: 4 MMOL/L — SIGNIFICANT CHANGE UP (ref 3.5–5.3)
POTASSIUM SERPL-SCNC: 4 MMOL/L — SIGNIFICANT CHANGE UP (ref 3.5–5.3)
PROT SERPL-MCNC: 7.2 G/DL — SIGNIFICANT CHANGE UP (ref 6–8.3)
RBC # BLD: 5.47 M/UL — SIGNIFICANT CHANGE UP (ref 4.2–5.8)
RBC # FLD: 13.3 % — SIGNIFICANT CHANGE UP (ref 10.3–14.5)
SARS-COV-2 RNA SPEC QL NAA+PROBE: SIGNIFICANT CHANGE UP
SODIUM SERPL-SCNC: 138 MMOL/L — SIGNIFICANT CHANGE UP (ref 135–145)
TROPONIN I, HIGH SENSITIVITY RESULT: 19.7 NG/L — SIGNIFICANT CHANGE UP
TROPONIN I, HIGH SENSITIVITY RESULT: 22.2 NG/L — SIGNIFICANT CHANGE UP
WBC # BLD: 9.72 K/UL — SIGNIFICANT CHANGE UP (ref 3.8–10.5)
WBC # FLD AUTO: 9.72 K/UL — SIGNIFICANT CHANGE UP (ref 3.8–10.5)

## 2022-08-01 PROCEDURE — 99223 1ST HOSP IP/OBS HIGH 75: CPT

## 2022-08-01 PROCEDURE — 71275 CT ANGIOGRAPHY CHEST: CPT | Mod: 26

## 2022-08-01 PROCEDURE — 99222 1ST HOSP IP/OBS MODERATE 55: CPT

## 2022-08-01 PROCEDURE — 99285 EMERGENCY DEPT VISIT HI MDM: CPT

## 2022-08-01 PROCEDURE — 71045 X-RAY EXAM CHEST 1 VIEW: CPT | Mod: 26

## 2022-08-01 PROCEDURE — 93306 TTE W/DOPPLER COMPLETE: CPT | Mod: 26

## 2022-08-01 PROCEDURE — 93010 ELECTROCARDIOGRAM REPORT: CPT

## 2022-08-01 RX ORDER — ALPRAZOLAM 0.25 MG
0.25 TABLET ORAL
Refills: 0 | Status: DISCONTINUED | OUTPATIENT
Start: 2022-08-01 | End: 2022-08-02

## 2022-08-01 RX ORDER — ALPRAZOLAM 0.25 MG
0.25 TABLET ORAL DAILY
Refills: 0 | Status: DISCONTINUED | OUTPATIENT
Start: 2022-08-01 | End: 2022-08-01

## 2022-08-01 RX ORDER — DILTIAZEM HCL 120 MG
25 CAPSULE, EXT RELEASE 24 HR ORAL ONCE
Refills: 0 | Status: COMPLETED | OUTPATIENT
Start: 2022-08-01 | End: 2022-08-01

## 2022-08-01 RX ORDER — ALPRAZOLAM 0.25 MG
1 TABLET ORAL
Qty: 0 | Refills: 0 | DISCHARGE

## 2022-08-01 RX ORDER — METOPROLOL TARTRATE 50 MG
50 TABLET ORAL
Qty: 0 | Refills: 0 | DISCHARGE

## 2022-08-01 RX ORDER — DILTIAZEM HCL 120 MG
30 CAPSULE, EXT RELEASE 24 HR ORAL ONCE
Refills: 0 | Status: COMPLETED | OUTPATIENT
Start: 2022-08-01 | End: 2022-08-01

## 2022-08-01 RX ORDER — ACETAMINOPHEN 500 MG
2 TABLET ORAL
Qty: 0 | Refills: 0 | DISCHARGE

## 2022-08-01 RX ORDER — LANOLIN ALCOHOL/MO/W.PET/CERES
3 CREAM (GRAM) TOPICAL AT BEDTIME
Refills: 0 | Status: DISCONTINUED | OUTPATIENT
Start: 2022-08-01 | End: 2022-08-02

## 2022-08-01 RX ORDER — ESOMEPRAZOLE MAGNESIUM 40 MG/1
1 CAPSULE, DELAYED RELEASE ORAL
Qty: 0 | Refills: 0 | DISCHARGE

## 2022-08-01 RX ORDER — ACETAMINOPHEN 500 MG
650 TABLET ORAL EVERY 6 HOURS
Refills: 0 | Status: DISCONTINUED | OUTPATIENT
Start: 2022-08-01 | End: 2022-08-02

## 2022-08-01 RX ORDER — METOPROLOL TARTRATE 50 MG
100 TABLET ORAL DAILY
Refills: 0 | Status: DISCONTINUED | OUTPATIENT
Start: 2022-08-01 | End: 2022-08-01

## 2022-08-01 RX ORDER — METOPROLOL TARTRATE 50 MG
50 TABLET ORAL DAILY
Refills: 0 | Status: DISCONTINUED | OUTPATIENT
Start: 2022-08-01 | End: 2022-08-01

## 2022-08-01 RX ORDER — ASPIRIN/CALCIUM CARB/MAGNESIUM 324 MG
324 TABLET ORAL ONCE
Refills: 0 | Status: COMPLETED | OUTPATIENT
Start: 2022-08-01 | End: 2022-08-01

## 2022-08-01 RX ORDER — FUROSEMIDE 40 MG
40 TABLET ORAL ONCE
Refills: 0 | Status: COMPLETED | OUTPATIENT
Start: 2022-08-01 | End: 2022-08-01

## 2022-08-01 RX ORDER — APIXABAN 2.5 MG/1
5 TABLET, FILM COATED ORAL EVERY 12 HOURS
Refills: 0 | Status: DISCONTINUED | OUTPATIENT
Start: 2022-08-01 | End: 2022-08-01

## 2022-08-01 RX ORDER — ESCITALOPRAM OXALATE 10 MG/1
1 TABLET, FILM COATED ORAL
Qty: 0 | Refills: 0 | DISCHARGE

## 2022-08-01 RX ORDER — METOPROLOL TARTRATE 50 MG
100 TABLET ORAL DAILY
Refills: 0 | Status: DISCONTINUED | OUTPATIENT
Start: 2022-08-01 | End: 2022-08-02

## 2022-08-01 RX ORDER — ONDANSETRON 8 MG/1
4 TABLET, FILM COATED ORAL EVERY 8 HOURS
Refills: 0 | Status: DISCONTINUED | OUTPATIENT
Start: 2022-08-01 | End: 2022-08-02

## 2022-08-01 RX ORDER — METOPROLOL TARTRATE 50 MG
25 TABLET ORAL
Qty: 0 | Refills: 0 | DISCHARGE

## 2022-08-01 RX ADMIN — Medication 40 MILLIGRAM(S): at 19:17

## 2022-08-01 RX ADMIN — Medication 30 MILLIGRAM(S): at 08:44

## 2022-08-01 RX ADMIN — Medication 0.25 MILLIGRAM(S): at 11:52

## 2022-08-01 RX ADMIN — Medication 324 MILLIGRAM(S): at 08:38

## 2022-08-01 RX ADMIN — Medication 100 MILLIGRAM(S): at 17:17

## 2022-08-01 RX ADMIN — Medication 40 MILLIGRAM(S): at 08:38

## 2022-08-01 RX ADMIN — Medication 25 MILLIGRAM(S): at 08:33

## 2022-08-01 NOTE — ED ADULT NURSE NOTE - OBJECTIVE STATEMENT
Pt presents to ED from home c/o chest tightness and SOB x8 hours. Pt tachycardic on arrival to room, placed on cardiac monitor. He reports lower extremity swelling for "a few weeks", left > right.

## 2022-08-01 NOTE — H&P ADULT - ASSESSMENT
This is a 57M with hx PVCs, tinnitus and anxiety who presents for increasing MAURO and chest tightness, found to have new onset AF and possible CHF exacerbation    #New onset AF  #AF with RVR  -Patient got Cardizem 25 mg IV in ED + 30 mg PO Cardizem   -Will increase home Lopressor to 100 mg ER (was on this for frequent PVCs)  -Will start Eliquis  -Cardiology consulted - Dr. Castro  -Check TSH, echo  -Normal stress test in 2020    #Possible CHF exacerbation, suspect diastolic - patient had normal EF in 2020  -Repeat echo  -Lasix 40 mg IV x 1 given  -Will give one more dose of IV Lasix this afternoon - and will make further decisions on future Lasix (such as PO) after cardio eval  -Daily weights  -Is and Os    #Morbid obesity  -BMI 42  -exercise regimen as tolerated    #Anxiety in setting of chronic tinnitus  -c/w Xanax  -iSTOP reviewed #[id number]    #DVT ppx: Starting Eliquis  IMPROVE VTE Individual Risk Assessment          RISK                                                          Points  [  ] Previous VTE                                                3  [  ] Thrombophilia                                             2  [  ] Lower limb paralysis                                   2        (unable to hold up >15 seconds)    [  ] Current Cancer                                             2         (within 6 months)  [  ] Immobilization > 24 hrs                              1  [  ] ICU/CCU stay > 24 hours                             1  [  ] Age > 60                                                         1    IMPROVE VTE Score: 0     This is a 57M with hx PVCs, tinnitus and anxiety who presents for increasing MAURO and chest tightness, found to have new onset AF and possible CHF exacerbation    #New onset AF  #AF with RVR  -Patient got Cardizem 25 mg IV in ED + 30 mg PO Cardizem   -Will increase home Lopressor to 100 mg ER (was on this for frequent PVCs)  -Will start Eliquis  -Cardiology consulted - Dr. Castro  -Check TSH, echo  -check CTA chest r/o PE - borderline elevated d-dimer, recent travel, new AF  -Normal stress test in 2020    #Possible CHF exacerbation, suspect diastolic - patient had normal EF in 2020  -Repeat echo  -Lasix 40 mg IV x 1 given  -Will give one more dose of IV Lasix this afternoon - and will make further decisions on future Lasix (such as PO) after cardio eval  -Daily weights  -Is and Os    #Morbid obesity  -BMI 42  -exercise regimen as tolerated    #Anxiety in setting of chronic tinnitus  -c/w Xanax  -iSTOP reviewed #[id number]    #DVT ppx: Starting Eliquis  IMPROVE VTE Individual Risk Assessment          RISK                                                          Points  [  ] Previous VTE                                                3  [  ] Thrombophilia                                             2  [  ] Lower limb paralysis                                   2        (unable to hold up >15 seconds)    [  ] Current Cancer                                             2         (within 6 months)  [  ] Immobilization > 24 hrs                              1  [  ] ICU/CCU stay > 24 hours                             1  [  ] Age > 60                                                         1    IMPROVE VTE Score: 0     This is a 57M with hx PVCs, tinnitus and anxiety who presents for increasing MAURO and chest tightness, found to have new onset AF and possible CHF exacerbation    #New onset AF  #AF with RVR  -Patient got Cardizem 25 mg IV in ED + 30 mg PO Cardizem   -Will increase home Lopressor to 100 mg ER (was on this for frequent PVCs)  -Will start Eliquis  -Cardiology consulted - Dr. Castro  -Check TSH, echo  -check CTA chest r/o PE - borderline elevated d-dimer, recent travel, new AF  -Normal stress test in 2020    #Possible CHF exacerbation, suspect diastolic - patient had normal EF in 2020  -Repeat echo  -Lasix 40 mg IV x 1 given  -Will give one more dose of IV Lasix this afternoon - and will make further decisions on future Lasix (such as PO) after cardio eval  -Daily weights  -Is and Os    #Morbid obesity  -BMI 42  -exercise regimen as tolerated    #Anxiety in setting of chronic tinnitus  -c/w Xanax  -iSTOP reviewed #[id number]    #DVT ppx: Starting Eliquis  IMPROVE VTE Individual Risk Assessment          RISK                                                          Points  [  ] Previous VTE                                                3  [  ] Thrombophilia                                             2  [  ] Lower limb paralysis                                   2        (unable to hold up >15 seconds)    [  ] Current Cancer                                             2         (within 6 months)  [  ] Immobilization > 24 hrs                              1  [  ] ICU/CCU stay > 24 hours                             1  [  ] Age > 60                                                         1    IMPROVE VTE Score: 0    Case d/w patient's wife, Melida, at bedside, [phone]   This is a 57M with hx PVCs, tinnitus and anxiety who presents for increasing MAURO and chest tightness, found to have new onset AF and possible CHF exacerbation    #New onset AF  #AF with RVR  -Patient got Cardizem 25 mg IV in ED + 30 mg PO Cardizem   -Will increase home Lopressor to 100 mg ER (was on this for frequent PVCs)  -Will start Eliquis  -Cardiology consulted - Dr. Castro  -Check TSH, echo  -check CTA chest r/o PE - borderline elevated d-dimer, recent travel, new AF  -Normal stress test in 2020    #Possible CHF exacerbation, suspect diastolic - patient had normal EF in 2020  -Repeat echo  -Lasix 40 mg IV x 1 given  -Will give one more dose of IV Lasix this afternoon - and will make further decisions on future Lasix (such as PO) after cardio eval  -Daily weights  -Is and Os    #Morbid obesity  -BMI 42  -exercise regimen as tolerated    #Chest pain  -Possible MSK from weightlifting  -No longer heaving chest pain  -Repeat troponin x 1  -Echo    #Anxiety in setting of chronic tinnitus  -c/w Xanax  -iSTOP reviewed #[id number]    #DVT ppx: Starting Eliquis  IMPROVE VTE Individual Risk Assessment          RISK                                                          Points  [  ] Previous VTE                                                3  [  ] Thrombophilia                                             2  [  ] Lower limb paralysis                                   2        (unable to hold up >15 seconds)    [  ] Current Cancer                                             2         (within 6 months)  [  ] Immobilization > 24 hrs                              1  [  ] ICU/CCU stay > 24 hours                             1  [  ] Age > 60                                                         1    IMPROVE VTE Score: 0    Case d/w patient's wife, Melida, at bedside, [phone]

## 2022-08-01 NOTE — ED ADULT NURSE NOTE - NSICDXPASTMEDICALHX_GEN_ALL_CORE_FT
PAST MEDICAL HISTORY:  At risk for sleep apnea     Cardiac arrhythmia     Class 2 obesity with body mass index (BMI) of 38.0 to 38.9 in adult     Frequent PVCs     GERD (gastroesophageal reflux disease)     Tinnitus     Ventral hernia

## 2022-08-01 NOTE — CONSULT NOTE ADULT - SUBJECTIVE AND OBJECTIVE BOX
GIUSEPPE MELENDEZ  [id number]      HPI:    Giuseppe Melendez is a 57 year old man with past medical history of PVCs and Anxiety who presents with progressive shortness of breath.      ALLERGIES:  No Known Allergies      PAST MEDICAL & SURGICAL HISTORY:  Tinnitus  Obesity   GERD (gastroesophageal reflux disease)  Ventral hernia  Frequent PVCs  History of nasal surgery      CURRENT MEDICATIONS:  acetaminophen     Tablet .. 650 milliGRAM(s) Oral every 6 hours PRN  ALPRAZolam 0.25 milliGRAM(s) Oral daily  aluminum hydroxide/magnesium hydroxide/simethicone Suspension 30 milliLiter(s) Oral every 4 hours PRN  apixaban 5 milliGRAM(s) Oral every 12 hours  furosemide   Injectable 40 milliGRAM(s) IV Push once  melatonin 3 milliGRAM(s) Oral at bedtime PRN  metoprolol succinate  milliGRAM(s) Oral daily  ondansetron Injectable 4 milliGRAM(s) IV Push every 8 hours PRN        FAMILY HISTORY:  Family history of heart disease (Father)  Family history of lymphoma (Mother)        ROS:  All 10 systems reviewed and positives noted in HPI    OBJECTIVE:    VITAL SIGNS:  Vital Signs Last 24 Hrs  T(C): 36.6 (01 Aug 2022 08:20), Max: 36.6 (01 Aug 2022 08:20)  T(F): 97.8 (01 Aug 2022 08:20), Max: 97.8 (01 Aug 2022 08:20)  HR: 107 (01 Aug 2022 09:15) (95 - 125)  BP: 135/69 (01 Aug 2022 09:15) (135/69 - 159/80)  BP(mean): 86 (01 Aug 2022 09:15) (86 - 129)  RR: 19 (01 Aug 2022 09:15) (18 - 20)  SpO2: 97% (01 Aug 2022 09:15) (97% - 99%)    Parameters below as of 01 Aug 2022 09:15  Patient On (Oxygen Delivery Method): room air        PHYSICAL EXAM:  General: well appearing, no distress  HEENT: sclera anicteric  Neck: supple, no carotid bruits b/l  CVS: JVP ~ 7 cm H20, RRR, s1, s2, no murmurs/rubs/gallops  Chest: unlabored respirations, clear to auscultation b/l  Abdomen: non-distended  Extremities: no lower extremity edema b/l  Neuro: awake, alert & oriented x 3  Psych: normal affect      LABS:                        17.3   9.72  )-----------( 227      ( 01 Aug 2022 08:30 )             49.9     08-01    138  |  101  |  18  ----------------------------<  132<H>  4.0   |  29  |  1.32<H>    Ca    9.0      01 Aug 2022 08:30  Mg     1.9     08-01    TPro  7.2  /  Alb  3.9  /  TBili  0.9  /  DBili  x   /  AST  29  /  ALT  68<H>  /  AlkPhos  69  08-01            CT Coronaries (7/2018):  Calcium score: 145  LAD: mid segment with less than 50% luminal narrowing  Diagonal: up to 50% luminal narrowing   LCx: patent  RCA: mid segment with less than 30% luminal narrowing     Outpatient TTE (2020):  LVEF 51%  Normal RV size and function     CT Angio Chest (8/1/22):  IMPRESSION: Questionable trace pericholecystic stranding. Recommend right upper quadrant ultrasound for further evaluation. No evidence of pulmonary embolism.    ECG (8/1/22): atrial fibrillation with RVR, PVC       GIUSEPPE MELENDEZ  [id number]      HPI:    Giuseppe Melendez is a 57 year old obese man with past medical history of PVCs and Anxiety presents with progressive leg swelling, orthopnea and paroxysmal nocturnal dyspnea. The patient reports that for the past few months he has had increasing leg swelling, was evaluated by Vascular and deemed to have no vascular issues. Reports that a few days ago he has noticed episodes of paroxysmal nocturnal dyspnea at night and new onset orthopnea. He denies any prior history of myocardial infarction. Denies chest discomfort at rest or on exertion. Denies palpitations or syncope.       ALLERGIES:  No Known Allergies      PAST MEDICAL & SURGICAL HISTORY:  Tinnitus  Obesity   GERD (gastroesophageal reflux disease)  Ventral hernia  Frequent PVCs  History of nasal surgery      CURRENT MEDICATIONS:  acetaminophen     Tablet .. 650 milliGRAM(s) Oral every 6 hours PRN  ALPRAZolam 0.25 milliGRAM(s) Oral daily  aluminum hydroxide/magnesium hydroxide/simethicone Suspension 30 milliLiter(s) Oral every 4 hours PRN  apixaban 5 milliGRAM(s) Oral every 12 hours  furosemide   Injectable 40 milliGRAM(s) IV Push once  melatonin 3 milliGRAM(s) Oral at bedtime PRN  metoprolol succinate  milliGRAM(s) Oral daily  ondansetron Injectable 4 milliGRAM(s) IV Push every 8 hours PRN        FAMILY HISTORY:  Family history of heart disease (Father)  Family history of lymphoma (Mother)        ROS:  All 10 systems reviewed and positives noted in HPI    OBJECTIVE:    VITAL SIGNS:  Vital Signs Last 24 Hrs  T(C): 36.6 (01 Aug 2022 08:20), Max: 36.6 (01 Aug 2022 08:20)  T(F): 97.8 (01 Aug 2022 08:20), Max: 97.8 (01 Aug 2022 08:20)  HR: 107 (01 Aug 2022 09:15) (95 - 125)  BP: 135/69 (01 Aug 2022 09:15) (135/69 - 159/80)  BP(mean): 86 (01 Aug 2022 09:15) (86 - 129)  RR: 19 (01 Aug 2022 09:15) (18 - 20)  SpO2: 97% (01 Aug 2022 09:15) (97% - 99%)    Parameters below as of 01 Aug 2022 09:15  Patient On (Oxygen Delivery Method): room air        PHYSICAL EXAM:  General: obese man, no acute distress  HEENT: sclera anicteric  Neck: supple, no carotid bruits b/l  CVS: JVP ~ 7 cm H20, irregular, s1, s2, no murmurs  Chest: unlabored respirations, faint crackles   Abdomen: obese  Extremities: mild lower extremity edema b/l  Neuro: awake, alert & oriented x 3  Psych: normal affect      LABS:                        17.3   9.72  )-----------( 227      ( 01 Aug 2022 08:30 )             49.9     08-01    138  |  101  |  18  ----------------------------<  132<H>  4.0   |  29  |  1.32<H>    Ca    9.0      01 Aug 2022 08:30  Mg     1.9     08-01    TPro  7.2  /  Alb  3.9  /  TBili  0.9  /  DBili  x   /  AST  29  /  ALT  68<H>  /  AlkPhos  69  08-01            CT Coronaries (7/2018):  Calcium score: 145  LAD: mid segment with less than 50% luminal narrowing  Diagonal: up to 50% luminal narrowing   LCx: patent  RCA: mid segment with less than 30% luminal narrowing     Outpatient TTE (2020):  LVEF 51%  Normal RV size and function     CT Angio Chest (8/1/22):  IMPRESSION: Questionable trace pericholecystic stranding. Recommend right upper quadrant ultrasound for further evaluation. No evidence of pulmonary embolism.    ECG (8/1/22): atrial fibrillation with RVR, PVC

## 2022-08-01 NOTE — PATIENT PROFILE ADULT - PRO INTERPRETER NEED 2
----- Message from REYNALDO Coppola sent at 9/28/2017 10:12 AM CDT -----  Regarding: FW: Patient referred to eye department  Please call mom to follow-up with her as to whether or not she wishes to proceed with this consultation.  ----- Message -----  From: Pily Pena  Sent: 9/28/2017  10:07 AM  To: REYNALDO Coppola  Subject: Patient referred to eye department               Blu was referred to the eye department.  I attempted to call him many times with out success. I sent an unable to contact letter to Blu and will defer the order for two weeks per the policy.     English

## 2022-08-01 NOTE — H&P ADULT - HISTORY OF PRESENT ILLNESS
This is a 57M with hx of PVCs, tinnitus and anxiety, who presents for shortness of breath x 5 days, worse with exertion, gradual onset. However, today, was associated with new onset chest pain, described as "tight", 8/10, substernal, non-radiating, + diaphoretic, but WITHOUT headache, N/V, arm pain, jaw pain. Patient states he did heavy weightlifting 3 days ago. Also, travelled to [place] 3 weeks ago (was on a plane ride). Does have chronic leg swelling x 4 months - had "extensive" testing by vascular surgery (Dr. Tidwell) - all testing negative, including arterial dopplers, vein mapping and ultrasound. No calf pain.     ( Patient used to take Lexapro but stopped 3 weeks ago after airline lost his luggage. )

## 2022-08-01 NOTE — ED PROVIDER NOTE - OBJECTIVE STATEMENT
pt states that he has been increasing SOB over the past week and about 8 hours ago started having moderate severity chest tightness, pt denies any fever/cough.  pt also reports increased swelling to b/l legs, states saw a vascular doctor who said circulation in legs was fine.

## 2022-08-01 NOTE — ED PROVIDER NOTE - PHYSICAL EXAMINATION
Gen: alert, NAD, obese  HEENT:  NC/AT, PERR, no exophthalmos  CV:  afib, well perfused, rrr   Pulm:  normal RR, I/E ratio and chest excursion  Abd: s/nt/nd  MSK: moving all extremities  Neuro:  non-focal  Skin:  visualized areas intact  Psych: AOx3

## 2022-08-01 NOTE — H&P ADULT - NSHPLABSRESULTS_GEN_ALL_CORE
.                            17.3   9.72  )-----------( 227      ( 01 Aug 2022 08:30 )             49.9       08-01    138  |  101  |  18  ----------------------------<  132  4.0   |  29  |  1.32    Ca    9.0      01 Aug 2022 08:30  Mg     1.9     08-01    TPro  7.2  /  Alb  3.9  /  TBili  0.9  /  DBili  x   /  AST  29  /  ALT  68  /  AlkPhos  69  08-01      CARDIAC MARKERS ( 01 Aug 2022 08:30 )  x     / 22.2 ng/L / x     / x     / x          Serum Pro-Brain Natriuretic Peptide: 1558 pg/mL (08-01-22 @ 08:30)    COVID-19 PCR: NotDetec (08-01-22 @ 08:40)    CTA chest: ORDERED / PENDING    EKG: AF with RVR and frequent PVCs - reviewed by me personally     Case d/w patient's PMD Dr Briceno regarding plan of care

## 2022-08-01 NOTE — ED PROVIDER NOTE - CARE PLAN
1 Principal Discharge DX:	Chest pain  Secondary Diagnosis:	Atrial fibrillation with RVR  Secondary Diagnosis:	Acute CHF

## 2022-08-01 NOTE — H&P ADULT - NSHPPHYSICALEXAM_GEN_ALL_CORE
Vital Signs Last 24 Hrs  T(F): 97.8 (01 Aug 2022 08:20), Max: 97.8 (01 Aug 2022 08:20)  HR: 107 (01 Aug 2022 09:15) (95 - 125)  BP: 135/69 (01 Aug 2022 09:15) (135/69 - 159/80)  RR: 19 (01 Aug 2022 09:15) (18 - 20)  SpO2: 97% (01 Aug 2022 09:15) (97% - 99%)    PHYSICAL EXAM:  GENERAL: NAD  HEAD:  Atraumatic, Normocephalic  EYES: EOMI, conjunctiva and sclera clear  ENMT: No gross hearing impairment, Moist mucous membranes, Good dentition, no thrush  NECK: Supple, No JVD  CHEST/LUNG: faint right basilar crackles, good air entry, non-labored breathing  HEART: IRRR; S1/S2, No murmur  ABDOMEN: Soft, Nontender, Nondistended; Bowel sounds present; obese  VASCULAR: Normal pulses, Normal capillary refill  EXTREMITIES: No calf tenderness, No cyanosis, 2+ pitting edema  LYMPH: Normal; No lymphadenopathy noted  SKIN: Warm, Intact, No rashes noted  PSYCH: Normal mood, Normal affect  NERVOUS SYSTEM:  A/O x3, Good concentration; CN 2-12 intact, No focal deficits

## 2022-08-01 NOTE — PATIENT PROFILE ADULT - FUNCTIONAL SCREEN CURRENT LEVEL: SWALLOWING (IF SCORE 2 OR MORE FOR ANY ITEM, CONSULT REHAB SERVICES), MLM)
Hpi Title: Evaluation of Skin Lesions
How Severe Are Your Spot(S)?: mild
Have Your Spot(S) Been Treated In The Past?: has not been treated
0 = swallows foods/liquids without difficulty

## 2022-08-01 NOTE — CONSULT NOTE ADULT - ASSESSMENT
Assessment:  Giuseppe Melendez is a 57 year old man with past medical history of PVCs and Anxiety who presents with progressive shortness of breath on exertion, found to have new onset atrial fibrillation.    Assessment:  Giuseppe Melendez is a 57 year old obese man with past medical history of PVCs and Anxiety who presents with progressive leg swelling, orthopnea and paroxysmal nocturnal dyspnea found to have new onset atrial fibrillation and signs suggestive of new congestive heart failure.    ECG consistent with atrial fibrillation with RVR with PVCs. Troponins are not elevated. Pro BNP elevated and exam with signs of volume overload. Given new onset atrial fibrillation and signs of congestive heart failure, recommend further ischemic workup.    Recommendations:  [] New onset atrial fibrillation: [id number] score appears borderline elevated, patient already started on Eliquis, hold evening dose prior to possible left and right heart catherization tomorrow. Continue metoprolol. Continue to monitor on telemetry. Follow up echo   [] Signs of congestive heart failure: Follow up echo to evaluate LVEF and regional wall motion. Receiving Lasix 40 mg IVP x 2 today, will re-evaluate volume status in AM. Will plan for ischemic evaluation with left heart catherization given prior CT coronaries in 2018 with mild CAD. Will also plan for right heart catherization to define filling pressures. Risks/benefits/alternatives were discussed with patient regarding catherization and he agrees to proceed. Please keep NPO after MN tonight     We will continue to follow along.    Ike Castro MD  Cardiology

## 2022-08-02 ENCOUNTER — TRANSCRIPTION ENCOUNTER (OUTPATIENT)
Age: 57
End: 2022-08-02

## 2022-08-02 ENCOUNTER — INPATIENT (INPATIENT)
Facility: HOSPITAL | Age: 57
LOS: 2 days | Discharge: HOME | DRG: 286 | End: 2022-08-05
Attending: INTERNAL MEDICINE | Admitting: INTERNAL MEDICINE
Payer: COMMERCIAL

## 2022-08-02 VITALS
DIASTOLIC BLOOD PRESSURE: 84 MMHG | RESPIRATION RATE: 16 BRPM | SYSTOLIC BLOOD PRESSURE: 135 MMHG | OXYGEN SATURATION: 96 % | TEMPERATURE: 98 F | HEART RATE: 79 BPM

## 2022-08-02 VITALS
WEIGHT: 315 LBS | RESPIRATION RATE: 16 BRPM | TEMPERATURE: 98 F | OXYGEN SATURATION: 99 % | SYSTOLIC BLOOD PRESSURE: 130 MMHG | DIASTOLIC BLOOD PRESSURE: 94 MMHG | HEART RATE: 116 BPM | HEIGHT: 77 IN

## 2022-08-02 DIAGNOSIS — Z98.890 OTHER SPECIFIED POSTPROCEDURAL STATES: Chronic | ICD-10-CM

## 2022-08-02 DIAGNOSIS — I42.9 CARDIOMYOPATHY, UNSPECIFIED: ICD-10-CM

## 2022-08-02 LAB
ANION GAP SERPL CALC-SCNC: 6 MMOL/L — SIGNIFICANT CHANGE UP (ref 5–17)
BASOPHILS # BLD AUTO: 0.08 K/UL — SIGNIFICANT CHANGE UP (ref 0–0.2)
BASOPHILS NFR BLD AUTO: 0.7 % — SIGNIFICANT CHANGE UP (ref 0–2)
BUN SERPL-MCNC: 17 MG/DL — SIGNIFICANT CHANGE UP (ref 7–23)
CALCIUM SERPL-MCNC: 9.2 MG/DL — SIGNIFICANT CHANGE UP (ref 8.4–10.5)
CHLORIDE SERPL-SCNC: 100 MMOL/L — SIGNIFICANT CHANGE UP (ref 96–108)
CO2 SERPL-SCNC: 34 MMOL/L — HIGH (ref 22–31)
CREAT SERPL-MCNC: 1.39 MG/DL — HIGH (ref 0.5–1.3)
EGFR: 60 ML/MIN/1.73M2 — SIGNIFICANT CHANGE UP
EOSINOPHIL # BLD AUTO: 0.25 K/UL — SIGNIFICANT CHANGE UP (ref 0–0.5)
EOSINOPHIL NFR BLD AUTO: 2.2 % — SIGNIFICANT CHANGE UP (ref 0–6)
GLUCOSE SERPL-MCNC: 121 MG/DL — HIGH (ref 70–99)
HCT VFR BLD CALC: 52.6 % — HIGH (ref 39–50)
HGB BLD-MCNC: 17.6 G/DL — HIGH (ref 13–17)
IMM GRANULOCYTES NFR BLD AUTO: 0.5 % — SIGNIFICANT CHANGE UP (ref 0–1.5)
LYMPHOCYTES # BLD AUTO: 19.7 % — SIGNIFICANT CHANGE UP (ref 13–44)
LYMPHOCYTES # BLD AUTO: 2.27 K/UL — SIGNIFICANT CHANGE UP (ref 1–3.3)
MAGNESIUM SERPL-MCNC: 2.3 MG/DL — SIGNIFICANT CHANGE UP (ref 1.6–2.6)
MCHC RBC-ENTMCNC: 30.8 PG — SIGNIFICANT CHANGE UP (ref 27–34)
MCHC RBC-ENTMCNC: 33.5 GM/DL — SIGNIFICANT CHANGE UP (ref 32–36)
MCV RBC AUTO: 92.1 FL — SIGNIFICANT CHANGE UP (ref 80–100)
MONOCYTES # BLD AUTO: 0.75 K/UL — SIGNIFICANT CHANGE UP (ref 0–0.9)
MONOCYTES NFR BLD AUTO: 6.5 % — SIGNIFICANT CHANGE UP (ref 2–14)
NEUTROPHILS # BLD AUTO: 8.13 K/UL — HIGH (ref 1.8–7.4)
NEUTROPHILS NFR BLD AUTO: 70.4 % — SIGNIFICANT CHANGE UP (ref 43–77)
NRBC # BLD: 0 /100 WBCS — SIGNIFICANT CHANGE UP (ref 0–0)
PHOSPHATE SERPL-MCNC: 3 MG/DL — SIGNIFICANT CHANGE UP (ref 2.5–4.5)
PLATELET # BLD AUTO: 246 K/UL — SIGNIFICANT CHANGE UP (ref 150–400)
POTASSIUM SERPL-MCNC: 4 MMOL/L — SIGNIFICANT CHANGE UP (ref 3.5–5.3)
POTASSIUM SERPL-SCNC: 4 MMOL/L — SIGNIFICANT CHANGE UP (ref 3.5–5.3)
RBC # BLD: 5.71 M/UL — SIGNIFICANT CHANGE UP (ref 4.2–5.8)
RBC # FLD: 13.7 % — SIGNIFICANT CHANGE UP (ref 10.3–14.5)
SODIUM SERPL-SCNC: 140 MMOL/L — SIGNIFICANT CHANGE UP (ref 135–145)
TSH SERPL-MCNC: 1.72 UIU/ML — SIGNIFICANT CHANGE UP (ref 0.36–3.74)
WBC # BLD: 11.54 K/UL — HIGH (ref 3.8–10.5)
WBC # FLD AUTO: 11.54 K/UL — HIGH (ref 3.8–10.5)

## 2022-08-02 PROCEDURE — 71045 X-RAY EXAM CHEST 1 VIEW: CPT

## 2022-08-02 PROCEDURE — 96374 THER/PROPH/DIAG INJ IV PUSH: CPT

## 2022-08-02 PROCEDURE — 99239 HOSP IP/OBS DSCHRG MGMT >30: CPT

## 2022-08-02 PROCEDURE — 99232 SBSQ HOSP IP/OBS MODERATE 35: CPT

## 2022-08-02 PROCEDURE — 84484 ASSAY OF TROPONIN QUANT: CPT

## 2022-08-02 PROCEDURE — 85025 COMPLETE CBC W/AUTO DIFF WBC: CPT

## 2022-08-02 PROCEDURE — 93460 R&L HRT ART/VENTRICLE ANGIO: CPT | Mod: 26

## 2022-08-02 PROCEDURE — 84443 ASSAY THYROID STIM HORMONE: CPT

## 2022-08-02 PROCEDURE — 83880 ASSAY OF NATRIURETIC PEPTIDE: CPT

## 2022-08-02 PROCEDURE — 93010 ELECTROCARDIOGRAM REPORT: CPT

## 2022-08-02 PROCEDURE — 99152 MOD SED SAME PHYS/QHP 5/>YRS: CPT

## 2022-08-02 PROCEDURE — 84100 ASSAY OF PHOSPHORUS: CPT

## 2022-08-02 PROCEDURE — 85379 FIBRIN DEGRADATION QUANT: CPT

## 2022-08-02 PROCEDURE — 83735 ASSAY OF MAGNESIUM: CPT

## 2022-08-02 PROCEDURE — 80048 BASIC METABOLIC PNL TOTAL CA: CPT

## 2022-08-02 PROCEDURE — 99285 EMERGENCY DEPT VISIT HI MDM: CPT | Mod: 25

## 2022-08-02 PROCEDURE — 96375 TX/PRO/DX INJ NEW DRUG ADDON: CPT

## 2022-08-02 PROCEDURE — 93005 ELECTROCARDIOGRAM TRACING: CPT

## 2022-08-02 PROCEDURE — 80053 COMPREHEN METABOLIC PANEL: CPT

## 2022-08-02 PROCEDURE — 87635 SARS-COV-2 COVID-19 AMP PRB: CPT

## 2022-08-02 PROCEDURE — 36415 COLL VENOUS BLD VENIPUNCTURE: CPT

## 2022-08-02 PROCEDURE — 71275 CT ANGIOGRAPHY CHEST: CPT | Mod: MA

## 2022-08-02 PROCEDURE — 93306 TTE W/DOPPLER COMPLETE: CPT

## 2022-08-02 RX ORDER — ALPRAZOLAM 0.25 MG
0.25 TABLET ORAL ONCE
Refills: 0 | Status: DISCONTINUED | OUTPATIENT
Start: 2022-08-02 | End: 2022-08-02

## 2022-08-02 RX ORDER — SACUBITRIL AND VALSARTAN 24; 26 MG/1; MG/1
1 TABLET, FILM COATED ORAL
Refills: 0 | Status: DISCONTINUED | OUTPATIENT
Start: 2022-08-02 | End: 2022-08-02

## 2022-08-02 RX ORDER — APIXABAN 2.5 MG/1
1 TABLET, FILM COATED ORAL
Qty: 0 | Refills: 0 | DISCHARGE

## 2022-08-02 RX ORDER — METOPROLOL TARTRATE 50 MG
25 TABLET ORAL ONCE
Refills: 0 | Status: COMPLETED | OUTPATIENT
Start: 2022-08-02 | End: 2022-08-02

## 2022-08-02 RX ORDER — METOPROLOL TARTRATE 50 MG
1 TABLET ORAL
Qty: 0 | Refills: 0 | DISCHARGE
Start: 2022-08-02

## 2022-08-02 RX ORDER — METOPROLOL TARTRATE 50 MG
100 TABLET ORAL DAILY
Refills: 0 | Status: DISCONTINUED | OUTPATIENT
Start: 2022-08-02 | End: 2022-08-05

## 2022-08-02 RX ORDER — APIXABAN 2.5 MG/1
5 TABLET, FILM COATED ORAL
Refills: 0 | Status: DISCONTINUED | OUTPATIENT
Start: 2022-08-02 | End: 2022-08-02

## 2022-08-02 RX ORDER — ALPRAZOLAM 0.25 MG
0.25 TABLET ORAL DAILY
Refills: 0 | Status: DISCONTINUED | OUTPATIENT
Start: 2022-08-02 | End: 2022-08-05

## 2022-08-02 RX ORDER — METOPROLOL TARTRATE 50 MG
1 TABLET ORAL
Qty: 0 | Refills: 0 | DISCHARGE

## 2022-08-02 RX ORDER — ALPRAZOLAM 0.25 MG
1 TABLET ORAL
Qty: 0 | Refills: 0 | DISCHARGE

## 2022-08-02 RX ORDER — SACUBITRIL AND VALSARTAN 24; 26 MG/1; MG/1
1 TABLET, FILM COATED ORAL
Qty: 0 | Refills: 0 | DISCHARGE
Start: 2022-08-02

## 2022-08-02 RX ADMIN — Medication 0.25 MILLIGRAM(S): at 21:00

## 2022-08-02 RX ADMIN — Medication 0.25 MILLIGRAM(S): at 05:24

## 2022-08-02 RX ADMIN — Medication 25 MILLIGRAM(S): at 18:29

## 2022-08-02 RX ADMIN — Medication 100 MILLIGRAM(S): at 11:59

## 2022-08-02 NOTE — DISCHARGE NOTE PROVIDER - NSDCFUADDINST_GEN_ALL_CORE_FT
Once your cardiac cath is completed - please follow up with the cardiologist at [facility] regarding  1) Dose of Metoprolol to take upon discharge (Depends on tele monitor / HR / BP readings throughout the day)  2) Initiation of Eliquis

## 2022-08-02 NOTE — DISCHARGE NOTE PROVIDER - NSDCCPCAREPLAN_GEN_ALL_CORE_FT
PRINCIPAL DISCHARGE DIAGNOSIS  Diagnosis: Chest pain  Assessment and Plan of Treatment: You were admitted for shortness of breath and chest pain  You were diagnosed with new onset atrial fibrillation and new congestive heart failure  You were treated with Metoprolol for rate control. You should start Eliquis - which can be prescribed prior to leaving [facility] - but held for cardiac cath. You are going to cardiac cath today      SECONDARY DISCHARGE DIAGNOSES  Diagnosis: Atrial fibrillation with RVR  Assessment and Plan of Treatment: as above    Diagnosis: Acute CHF  Assessment and Plan of Treatment: as above. Improved with Lasix. Start Entresto and Metoprolol XL. Follow up with your cardiology. EF 40%

## 2022-08-02 NOTE — H&P CARDIOLOGY - SURGICAL SITE INCISION
Have you had a sleep study done before? no    Are you using PAP?no    Do you live in an assisted living facility, group home, or nursing home?no    Are you independent with daily living activities? yes    Do you need assistance using the restroom? no    Do you have a history of bowel or urinary incontinence? If yes, do you need a bedside commode or bed pan? no    Do you walk with assisted devices, such as walker, cane, wheelchair or scooter? no    Will you need any assistance walking to the Sleep Lab? no    Have you had any recent falls? no    Are you able to move independently in and out of bed? yes    Are you able to sleep in a traditional bed? yes    Are you on supplemental oxygen? no    Do you have any medical conditions that we should be aware of?no    Bed Time : varies   Wake Time:   Varies    Information mailed out to pt in mail  Went through the medications he is to stop prior to his sleep study.  Told pt if he has any concerns with stopping of his medications to call Dr. Babin's office.       Any medical changes that would require you to need assistance with walking or daily actives that happen between now and your schedule appointment, please call and let us know.    Advise pt to avoid napping the day of sleep study appointment, also mention to avoid caffeine after the noon hour on the day of the sleep study.  Pt should bring something comfortable to sleep in.        
no

## 2022-08-02 NOTE — DISCHARGE NOTE PROVIDER - HOSPITAL COURSE
Hospital Course  HPI:  This is a 57M with hx of PVCs, tinnitus and anxiety, who presents for shortness of breath x 5 days, worse with exertion, gradual onset. However, today, was associated with new onset chest pain, described as "tight", 8/10, substernal, non-radiating, + diaphoretic, but WITHOUT headache, N/V, arm pain, jaw pain. Patient states he did heavy weightlifting 3 days ago. Also, travelled to [place] 3 weeks ago (was on a plane ride). Does have chronic leg swelling x 4 months - had "extensive" testing by vascular surgery (Dr. Tidwell) - all testing negative, including arterial dopplers, vein mapping and ultrasound. No calf pain.    Patient ruled out for ACS. Noted new AF and acute decompensated CHF. Improved with IV lasix and increased rate control medications. Patient to be transferred for LHC and RHC today - and will then need to follow-up with cardio regarding PAF.     You will need to follow up with your primary care physician.    Discharging Provider:  Hunter Knight MD  Contact Info: Cell [phone] - Please call with any questions or concerns.    Outpatient Provider: Dr. Briceno

## 2022-08-02 NOTE — PROGRESS NOTE ADULT - ASSESSMENT
Assessment:  Giuseppe Melendez is a 57 year old obese man with past medical history of PVCs and Anxiety who presents with progressive leg swelling, orthopnea and paroxysmal nocturnal dyspnea found to have new onset atrial fibrillation and signs of new congestive heart failure.    ECG consistent with atrial fibrillation with RVR with PVCs. Troponins are not elevated. Pro BNP elevated and exam with signs of volume overload. Given new onset atrial fibrillation and signs of congestive heart failure, recommend further ischemic workup.    Recommendations:  [] New cardiomyopathy: LVEF moderately reduced ~ 40%. S/p IV lasix and patient more euvolemic today, will hold further diuresis. Will plan for ischemic evaluation with left heart catherization given prior CT coronaries in 2018 with mild CAD. Will also plan for right heart catherization to define filling pressures. Risks/benefits/alternatives were discussed with patient regarding catherization and he agrees to proceed. Patient accepted by interventional cardiology, Dr. Jan Kevin at [facility] for procedures today. Recommend to start Entresto 24/26 mg BID if renal function stable after angiogram. Continue metoprolol succinate 100 mg daily. Further CHF workup to be evaluated pending angiogram results  [] New onset atrial fibrillation: TSH normal. [id number] score appears borderline elevated, Eliquis being held for left and right heart catherization. Continue metoprolol succinate 100 mg daily. If coronary angiogram consistent with non-obstructive CAD would recommend Cardiac EP evaluation as he may have a tachy-mediated cardiomyopathy and may benefit from SHRADDHA/DCCV vs ablation.     Patient being transferred to [facility] cath lab today for LHC and RHC. Discussed with Dr. Knight. Discussed with Dr. Kevin.    Ike Castro MD  Cardiology     
PLAN:    -rate control with lopressor  -was started on eliquis, howeve gregory hold for possible cardfiac cath tomorrow  -NPO after midnight  -had CTA chest, (-) for PE  -trops (-) x 2  -AM labs

## 2022-08-02 NOTE — PROGRESS NOTE ADULT - SUBJECTIVE AND OBJECTIVE BOX
SAHIL CARVAJAL  [id number]      Chief Complaint: New onset atrial fibrillation/New Cardiomyopathy     Interval History: The patient reports mild palpitations today.     Tele: atrial fibrillation 110s BPM      Current meds:   acetaminophen     Tablet .. 650 milliGRAM(s) Oral every 6 hours PRN  ALPRAZolam 0.25 milliGRAM(s) Oral two times a day PRN  aluminum hydroxide/magnesium hydroxide/simethicone Suspension 30 milliLiter(s) Oral every 4 hours PRN  melatonin 3 milliGRAM(s) Oral at bedtime PRN  metoprolol succinate  milliGRAM(s) Oral daily  ondansetron Injectable 4 milliGRAM(s) IV Push every 8 hours PRN  sacubitril 24 mG/valsartan 26 mG 1 Tablet(s) Oral two times a day      Objective:     Vital Signs:   T(C): 36.8 (08-02-22 @ 05:55), Max: 37 (08-01-22 @ 21:06)  HR: 52 (08-02-22 @ 05:55) (50 - 116)  BP: 140/86 (08-02-22 @ 05:55) (113/85 - 146/77)  RR: 14 (08-02-22 @ 05:55) (14 - 19)  SpO2: 97% (08-02-22 @ 05:55) (94% - 97%)  Wt(kg): --      PHYSICAL EXAM:  General: obese man, no acute distress  HEENT: sclera anicteric  Neck: supple, no carotid bruits b/l  CVS: JVP ~ 7 cm H20, irregular, s1, s2, no murmurs  Chest: unlabored respirations, mostly clear to auscultation   Abdomen: obese  Extremities: trace lower extremity edema b/l  Neuro: awake, alert & oriented x 3  Psych: normal affect        Labs:   02 Aug 2022 06:25    140    |  100    |  17     ----------------------------<  121    4.0     |  34     |  1.39     Ca    9.2        02 Aug 2022 06:25  Phos  3.0       02 Aug 2022 06:25  Mg     2.3       02 Aug 2022 06:25    TPro  7.2    /  Alb  3.9    /  TBili  0.9    /  DBili  x      /  AST  29     /  ALT  68     /  AlkPhos  69     01 Aug 2022 08:30                          17.6   11.54 )-----------( 246      ( 02 Aug 2022 06:25 )             52.6               CT Coronaries (7/2018):  Calcium score: 145  LAD: mid segment with less than 50% luminal narrowing  Diagonal: up to 50% luminal narrowing   LCx: patent  RCA: mid segment with less than 30% luminal narrowing     Outpatient TTE (2020):  LVEF 51%  Normal RV size and function     CT Angio Chest (8/1/22):  IMPRESSION: Questionable trace pericholecystic stranding. Recommend right upper quadrant ultrasound for further evaluation. No evidence of pulmonary embolism.    ECG (8/1/22): atrial fibrillation with RVR, PVC      TTE (8/1/22):   1. Technically difficult study with poor endocardial visualization.   2. Assessment of left ventricle regional wall motion and left ventricle   ejection fraction is limited due to poor endocardial visualization.   Consider use of IV echo contrast to better evaluate endocardium, if   clinically indicated. The left ventricle systolic function appears at   least moderately reduced, estimated LVEF 40%.   3. Normal left ventricular internal cavity size.   4. Increased LV wall thickness.   5. Possible moderator band visualized in the right ventricle.   6. Mild mitral annular calcification.   7. There appears to be some degree of mitral regurgitation, further   assessment limited by poor visualization of mitral valve leaflets.   8. The aortic valve leaflets are not well visualized, appears to have   calcification. Suboptimal Doppler assessments of aortic valve velocities limits further evaluation.   9. The pericardium was not well visualized.        
F/U Note:    57y Male admitted with SOB progressing to chest pain and new onset afib with rvr    Interval Hx:  -remains in afib     Vital Signs Last 24 Hrs  T(C): 37 (01 Aug 2022 21:06), Max: 37 (01 Aug 2022 21:06)  T(F): 98.6 (01 Aug 2022 21:06), Max: 98.6 (01 Aug 2022 21:06)  HR: 50 (01 Aug 2022 21:06) (50 - 125)  BP: 117/77 (01 Aug 2022 21:06) (113/85 - 159/80)  BP(mean): 101 (01 Aug 2022 12:50) (86 - 129)  RR: 17 (01 Aug 2022 21:06) (17 - 20)  SpO2: 94% (01 Aug 2022 21:06) (94% - 99%)    Parameters below as of 01 Aug 2022 21:06  Patient On (Oxygen Delivery Method): room air                                17.3   9.72  )-----------( 227      ( 01 Aug 2022 08:30 )             49.9         08-01    138  |  101  |  18  ----------------------------<  132<H>  4.0   |  29  |  1.32<H>    Ca    9.0      01 Aug 2022 08:30  Mg     1.9     08-01    TPro  7.2  /  Alb  3.9  /  TBili  0.9  /  DBili  x   /  AST  29  /  ALT  68<H>  /  AlkPhos  69  08-01        NEURO: no headaches, blurry vision, tremors, depression, anxity  CV: no chest pain, palpitations, murmurs, orthopnea  Resp: no shortness of breath, cough, wheeze, sputum production  GI: no stomach pain,nausea, vomitting, flatulence, hematemesis, hematochezia  PV: no swelling of extremities, no hair loss, no coolness to extremities  ENDO: no polydypsia, polyphagia, polyuria, weight loss, night sweats          NEURO: awake and alert  CV: (+) S1/S2, irregularly irregular, no MRG   RESP: CTA b/l  GI: soft, non tender

## 2022-08-02 NOTE — DISCHARGE NOTE PROVIDER - CARE PROVIDER_API CALL
Alex Briceno J  INTERNAL MEDICINE  [address] [id number]  Phone: [phone]  Fax: [phone]  Follow Up Time:

## 2022-08-02 NOTE — H&P CARDIOLOGY - HISTORY OF PRESENT ILLNESS
56 yo M with no significant PMhx PRESENTED TO [facility] on 8/1  see H&P below from GC :      57M with hx of PVCs, tinnitus and anxiety, who presents for shortness of breath x 5 days, worse with exertion, gradual onset. However, today, was associated with new onset chest pain, described as "tight", 8/10, substernal, non-radiating, + diaphoretic, but WITHOUT headache, N/V, arm pain, jaw pain. Patient states he did heavy weightlifting 3 days ago. Also, travelled to [place] 3 weeks ago (was on a plane ride). Does have chronic leg swelling x 4 months - had "extensive" testing by vascular surgery (Dr. Tidwell) - all testing negative, including arterial dopplers, vein mapping and ultrasound. No calf pain.   (end of copied text)    Patient had an ECHO on 8/1  that revealed :     1.   Technically difficult study with poor endocardial visualization.   2. Assessment of left ventricle regional wall motion and left ventricle   ejection fraction is limited due to poor endocardial visualization.   Consider use of IV echo contrast to better evaluate endocardium, if   clinically indicated. The left ventricle systolic function appears at   least moderately reduced, estimated LVEF    40%.   3. Normal left ventricular internal cavity size.   4. Increased LV wall thickness.   5. Possible moderator band visualized in the right ventricle.   6. Mild mitral annular calcification.   7. There appears to be some degree of mitral regurgitation, further   assessment limited by poor visualization of mitral valve leaflets.   8. The aortic valve leaflets are not well visualized, appears to have   calcification. Suboptimal Doppler assessments of aortic valve velocities   limits further evaluation.   9. The pericardium was not well visualized.      8/1 CTPA - negative for PE .   No fever or chills, no N/V/D oar abd pain .  He was transferred here for cardiac catheterization .  56 yo M with no significant PMhx PRESENTED TO [facility] on 8/1  see H&P below from [facility] :      57M with hx of PVCs, tinnitus and anxiety, who presents for shortness of breath x 5 days, worse with exertion, gradual onset. However, today, was associated with new onset chest pain, described as "tight", 8/10, substernal, non-radiating, + diaphoretic, but WITHOUT headache, N/V, arm pain, jaw pain. Patient states he did heavy weightlifting 3 days ago. Also, travelled to [place] 3 weeks ago (was on a plane ride). Does have chronic leg swelling x 4 months - had "extensive" testing by vascular surgery (Dr. Tidwell) - all testing negative, including arterial dopplers, vein mapping and ultrasound. No calf pain.   (end of copied text)  He was found to be in Afib and was given cardizem and started on metoprolol. He was started on eliquis . He was given lasix which relieved his chest tightness AND ENTRESTO was also started at [facility].     Patient had an ECHO on 8/1  that revealed :     1.   Technically difficult study with poor endocardial visualization.   2. Assessment of left ventricle regional wall motion and left ventricle   ejection fraction is limited due to poor endocardial visualization.   Consider use of IV echo contrast to better evaluate endocardium, if   clinically indicated. The left ventricle systolic function appears at   least moderately reduced, estimated LVEF    40%.   3. Normal left ventricular internal cavity size.   4. Increased LV wall thickness.   5. Possible moderator band visualized in the right ventricle.   6. Mild mitral annular calcification.   7. There appears to be some degree of mitral regurgitation, further   assessment limited by poor visualization of mitral valve leaflets.   8. The aortic valve leaflets are not well visualized, appears to have   calcification. Suboptimal Doppler assessments of aortic valve velocities   limits further evaluation.   9. The pericardium was not well visualized.      8/1 CTPA - negative for PE .     No fever or chills, no N/V/D or abd pain . He was transferred here for cardiac catheterization .

## 2022-08-02 NOTE — PATIENT PROFILE ADULT - FALL HARM RISK - UNIVERSAL INTERVENTIONS
Bed in lowest position, wheels locked, appropriate side rails in place/Call bell, personal items and telephone in reach/Instruct patient to call for assistance before getting out of bed or chair/Non-slip footwear when patient is out of bed/[place] to call system/Physically safe environment - no spills, clutter or unnecessary equipment/Purposeful Proactive Rounding/Room/bathroom lighting operational, light cord in reach

## 2022-08-02 NOTE — DISCHARGE NOTE PROVIDER - NSDCMRMEDTOKEN_GEN_ALL_CORE_FT
Eliquis 5 mg oral tablet: 1 tab(s) orally 2 times a day  metoprolol succinate 100 mg oral tablet, extended release: 1 tab(s) orally once a day  sacubitril-valsartan 24 mg-26 mg oral tablet: 1 tab(s) orally 2 times a day  testosterone: every 10-12 weeks  Xanax 0.25 mg oral tablet: 1 tab(s) orally once a day

## 2022-08-03 LAB
ANION GAP SERPL CALC-SCNC: 12 MMOL/L — SIGNIFICANT CHANGE UP (ref 5–17)
BUN SERPL-MCNC: 17 MG/DL — SIGNIFICANT CHANGE UP (ref 7–23)
CALCIUM SERPL-MCNC: 9 MG/DL — SIGNIFICANT CHANGE UP (ref 8.4–10.5)
CHLORIDE SERPL-SCNC: 101 MMOL/L — SIGNIFICANT CHANGE UP (ref 96–108)
CO2 SERPL-SCNC: 28 MMOL/L — SIGNIFICANT CHANGE UP (ref 22–31)
CREAT SERPL-MCNC: 1.22 MG/DL — SIGNIFICANT CHANGE UP (ref 0.5–1.3)
EGFR: 70 ML/MIN/1.73M2 — SIGNIFICANT CHANGE UP
GLUCOSE SERPL-MCNC: 106 MG/DL — HIGH (ref 70–99)
HCT VFR BLD CALC: 48.6 % — SIGNIFICANT CHANGE UP (ref 39–50)
HGB BLD-MCNC: 16.5 G/DL — SIGNIFICANT CHANGE UP (ref 13–17)
MCHC RBC-ENTMCNC: 30.6 PG — SIGNIFICANT CHANGE UP (ref 27–34)
MCHC RBC-ENTMCNC: 34 GM/DL — SIGNIFICANT CHANGE UP (ref 32–36)
MCV RBC AUTO: 90.2 FL — SIGNIFICANT CHANGE UP (ref 80–100)
NRBC # BLD: 0 /100 WBCS — SIGNIFICANT CHANGE UP (ref 0–0)
PLATELET # BLD AUTO: 226 K/UL — SIGNIFICANT CHANGE UP (ref 150–400)
POTASSIUM SERPL-MCNC: 3.8 MMOL/L — SIGNIFICANT CHANGE UP (ref 3.5–5.3)
POTASSIUM SERPL-SCNC: 3.8 MMOL/L — SIGNIFICANT CHANGE UP (ref 3.5–5.3)
RBC # BLD: 5.39 M/UL — SIGNIFICANT CHANGE UP (ref 4.2–5.8)
RBC # FLD: 13.6 % — SIGNIFICANT CHANGE UP (ref 10.3–14.5)
SODIUM SERPL-SCNC: 141 MMOL/L — SIGNIFICANT CHANGE UP (ref 135–145)
WBC # BLD: 9.87 K/UL — SIGNIFICANT CHANGE UP (ref 3.8–10.5)
WBC # FLD AUTO: 9.87 K/UL — SIGNIFICANT CHANGE UP (ref 3.8–10.5)

## 2022-08-03 PROCEDURE — 92960 CARDIOVERSION ELECTRIC EXT: CPT

## 2022-08-03 PROCEDURE — 93010 ELECTROCARDIOGRAM REPORT: CPT | Mod: 76

## 2022-08-03 PROCEDURE — 93010 ELECTROCARDIOGRAM REPORT: CPT

## 2022-08-03 PROCEDURE — 99223 1ST HOSP IP/OBS HIGH 75: CPT | Mod: 25

## 2022-08-03 PROCEDURE — 99223 1ST HOSP IP/OBS HIGH 75: CPT

## 2022-08-03 PROCEDURE — 93306 TTE W/DOPPLER COMPLETE: CPT | Mod: 26

## 2022-08-03 PROCEDURE — 93312 ECHO TRANSESOPHAGEAL: CPT | Mod: 26

## 2022-08-03 RX ORDER — SACUBITRIL AND VALSARTAN 24; 26 MG/1; MG/1
1 TABLET, FILM COATED ORAL
Refills: 0 | Status: DISCONTINUED | OUTPATIENT
Start: 2022-08-03 | End: 2022-08-05

## 2022-08-03 RX ORDER — SIMETHICONE 80 MG/1
80 TABLET, CHEWABLE ORAL EVERY 6 HOURS
Refills: 0 | Status: DISCONTINUED | OUTPATIENT
Start: 2022-08-03 | End: 2022-08-05

## 2022-08-03 RX ORDER — FUROSEMIDE 40 MG
40 TABLET ORAL ONCE
Refills: 0 | Status: COMPLETED | OUTPATIENT
Start: 2022-08-03 | End: 2022-08-03

## 2022-08-03 RX ORDER — APIXABAN 2.5 MG/1
5 TABLET, FILM COATED ORAL EVERY 12 HOURS
Refills: 0 | Status: DISCONTINUED | OUTPATIENT
Start: 2022-08-03 | End: 2022-08-05

## 2022-08-03 RX ORDER — AMIODARONE HYDROCHLORIDE 400 MG/1
TABLET ORAL
Refills: 0 | Status: DISCONTINUED | OUTPATIENT
Start: 2022-08-03 | End: 2022-08-05

## 2022-08-03 RX ORDER — FUROSEMIDE 40 MG
40 TABLET ORAL
Refills: 0 | Status: DISCONTINUED | OUTPATIENT
Start: 2022-08-04 | End: 2022-08-05

## 2022-08-03 RX ORDER — AMIODARONE HYDROCHLORIDE 400 MG/1
200 TABLET ORAL DAILY
Refills: 0 | Status: CANCELLED | OUTPATIENT
Start: 2022-08-07 | End: 2022-08-05

## 2022-08-03 RX ORDER — AMIODARONE HYDROCHLORIDE 400 MG/1
400 TABLET ORAL EVERY 8 HOURS
Refills: 0 | Status: DISCONTINUED | OUTPATIENT
Start: 2022-08-03 | End: 2022-08-05

## 2022-08-03 RX ADMIN — Medication 40 MILLIGRAM(S): at 17:00

## 2022-08-03 RX ADMIN — AMIODARONE HYDROCHLORIDE 400 MILLIGRAM(S): 400 TABLET ORAL at 21:37

## 2022-08-03 RX ADMIN — Medication 100 MILLIGRAM(S): at 04:59

## 2022-08-03 RX ADMIN — APIXABAN 5 MILLIGRAM(S): 2.5 TABLET, FILM COATED ORAL at 09:58

## 2022-08-03 RX ADMIN — Medication 0.25 MILLIGRAM(S): at 17:01

## 2022-08-03 RX ADMIN — APIXABAN 5 MILLIGRAM(S): 2.5 TABLET, FILM COATED ORAL at 17:01

## 2022-08-03 RX ADMIN — SACUBITRIL AND VALSARTAN 1 TABLET(S): 24; 26 TABLET, FILM COATED ORAL at 17:29

## 2022-08-03 RX ADMIN — Medication 30 MILLILITER(S): at 18:52

## 2022-08-03 NOTE — CONSULT NOTE ADULT - ASSESSMENT
57M with PMHx of PVCs (Dr. Taylor), tinnitus and anxiety related to tinnitus presenting with acute shortness of breath and chest tightness on Monday night 8/1. Afib on presentation, being treated for ADHF.    #New Onset Atrial Fibrillation   #Acute Decompensated Heart Failure  #h/o PVCs  Follows Dr. Taylor for PVCs on Toprol XL 50 qD. Patient with 3 months of peripheral edema. Acute MAURO and chest tightness on Aug 1. Started on rate control for new onset atrial fibrillation 57M with PMHx of PVCs (Dr. Taylor), tinnitus and anxiety related to tinnitus presenting with acute shortness of breath and chest tightness on Monday night 8/1. Afib on presentation, being treated for ADHF.    Recommendations not final until discussed with Dr. Denson    #New Onset Atrial Fibrillation   #Acute Decompensated Heart Failure  #h/o PVCs  Follows Dr. Taylor for PVCs on Toprol XL 50 qD. Patient with 3 months of new peripheral edema. Acute dyspnea and chest tightness on Aug 1. Started on rate control for new onset atrial fibrillation, diuresed and started on entresto with resolution of symptoms. ACS ruled out. LHC with non-obstructive triple vessel disease. Echo limited by body habitus. CTPA negative. TSH wnl. Rate controlled on Toprol xL 100 qD with spot dose lopressor 25.   - continue titration of AVN for rate control  - keep NPO  - continue sreedhar Sanz MD  Internal Medicine, PGY-1 57M with PMHx of PVCs (Dr. Taylor), tinnitus and anxiety related to tinnitus presenting with acute shortness of breath and chest tightness on Monday night 8/1. Afib on presentation, being treated for ADHF.    Recommendations not final until discussed with Dr. Denson    #New Onset Atrial Fibrillation   #Acute Decompensated Heart Failure  #h/o PVCs  Follows Dr. Taylor for PVCs on Toprol XL 50 qD. Patient with 3 months of new peripheral edema. Acute dyspnea and chest tightness on Aug 1. Started on rate control for new onset atrial fibrillation, diuresed and started on entresto with resolution of symptoms. ACS ruled out. LHC with non-obstructive triple vessel disease. Echo limited by body habitus. CTPA negative. TSH wnl. Rate controlled on Toprol xL 100 qD with spot dose lopressor 25.   - continue titration of AVN for rate control  - keep NPO  - CHADVASC 0     Jose Sanz MD  Internal Medicine, PGY-1 57M with PMHx of PVCs (Dr. Taylor), tinnitus and anxiety related to tinnitus presenting with acute shortness of breath and chest tightness on Monday night 8/1. Afib on presentation, being treated for ADHF.    #New Onset Atrial Fibrillation   #Acute Decompensated Heart Failure  #h/o PVCs  Follows Dr. Taylor for PVCs on Toprol XL 50 qD. Patient with 3 months of new peripheral edema. Acute dyspnea and chest tightness on Aug 1. Started on rate control for new onset atrial fibrillation, diuresed and started on entresto with resolution of symptoms. ACS ruled out. [facility] with non-obstructive triple vessel disease. Echo limited by body habitus. CTPA negative. TSH wnl. Rate controlled on Toprol xL 100 qD with spot dose lopressor 25.   - s/p SHRADDHA w/ Cardioversion (8/3) now in SR  - Amiodarone 400 mg q8  - Goal: 10 gram load  - Once loaded, then 200 qD  - While on amiodarone will need outpatient ophthalmology/TFT/LFT/PFT follow up  - Recommend outpatient evaluation for SAMIRA    Jose Sanz MD  Internal Medicine, PGY-1    Plan discussed with Dr. Denson and primary. 57M with PMHx of PVCs (Dr. Taylor), tinnitus and anxiety related to tinnitus presenting with acute shortness of breath and chest tightness on Monday night 8/1. Afib on presentation, being treated for ADHF.    #New Onset Atrial Fibrillation   #Acute Decompensated Heart Failure  #h/o PVCs  Follows Dr. Taylor for PVCs on Toprol XL 50 qD. Patient with 3 months of new peripheral edema. Acute dyspnea and chest tightness on Aug 1. Started on rate control for new onset atrial fibrillation, diuresed and started on entresto with resolution of symptoms. ACS ruled out. [facility] with non-obstructive triple vessel disease. Echo limited by body habitus. CTPA negative. TSH wnl. Rate controlled on Toprol xL 100 qD with spot dose lopressor 25. Unclear if atrial fibrillation preceded ADHF or vice versa.  - s/p SHRADDHA guided Cardioversion (8/3) now in NSR  - Amiodarone 400 mg q8  - Goal: 10 gram load  - Once loaded, then 200 qD  - While on amiodarone will need outpatient ophthalmology/TFT/LFT/PFT follow up  - Recommend outpatient evaluation for SAMIRA    Jose Sanz MD  Internal Medicine, PGY-1    Plan discussed with Dr. Denson and primary.

## 2022-08-03 NOTE — CONSULT NOTE ADULT - SUBJECTIVE AND OBJECTIVE BOX
57M with PMHx of PVCs (Dr. Taylor), tinnitus and anxiety related to tinnitus presents with acute shortness of breath and chest tightness on Monday night 8/1. Chest tightness substernal, non-radiating, unlike pain before. Shortness of breath was at rest, unable to lay flat, unresolved after 10 hours at which point he presented to [facility]. Additionally with bilateral leg swelling for last three months but otherwise asymptomatic.     On presentation was found to be in Afib, provided cardizem, metoprolol, lasix and entresto.  CHIEF COMPLAINT:    HISTORY OF PRESENT ILLNESS:  57M with PMHx of PVCs (Dr. Taylor), tinnitus and anxiety related to tinnitus presents with acute shortness of breath and chest tightness on Monday night 8/1. Chest tightness substernal, non-radiating, unlike pain before. Shortness of breath was at rest, unable to lay flat, unresolved after 10 hours at which point he presented to [facility]. Additionally with bilateral leg swelling for last three months but otherwise asymptomatic.     On presentation was found to be in afib and ADHF, cardizem, metoprolol, lasix and entresto. Eliquis not started for LHC and RHC completed yesterday 8/2.     Patient has no history of atrial fibrillation. Has known to have PVCs since his 20s.     Allergies  No Known Allergies  Intolerances    MEDICATIONS:  apixaban 5 milliGRAM(s) Oral every 12 hours  metoprolol succinate  milliGRAM(s) Oral daily  ALPRAZolam 0.25 milliGRAM(s) Oral daily    PAST MEDICAL & SURGICAL HISTORY:  Tinnitus  At risk for sleep apnea  Class 2 obesity with body mass index (BMI) of 38.0 to 38.9 in adult  GERD (gastroesophageal reflux disease)  Ventral hernia  Cardiac arrhythmia  Frequent PVCs  History of nasal surgery  2008    FAMILY HISTORY:  Family history of heart disease (Father)  Family history of lymphoma (Mother)    SOCIAL HISTORY:    [ ]Non-smoker  [ ] Smoker  [ ] Alcohol    REVIEW OF SYSTEMS:  See HPI. Otherwise, 10 point ROS done and otherwise negative.    PHYSICAL EXAM:  T(C): 36.4 (08-03-22 @ 09:30), Max: 36.7 (08-02-22 @ 11:30)  HR: 108 (08-03-22 @ 09:30) (96 - 117)  BP: 118/99 (08-03-22 @ 09:30) (100/69 - 130/94)  RR: 18 (08-03-22 @ 09:30) (16 - 19)  SpO2: 96% (08-03-22 @ 09:30) (95% - 100%)  Wt(kg): --  I&O's Summary      Appearance: Alert. NAD	  HEENT:   NC/AT	  Cardiovascular: +S1S2 RRR no m/g/r  Respiratory: CTA B/L	  Psychiatry: A & O x 3, Mood & affect appropriate  Gastrointestinal:  Soft, NT.ND., + BS	  Skin: No rashes	  Neurologic: Non-focal  Extremities: No edema BLE  Vascular: Peripheral pulses palpable 2+ bilaterally      LABS:	 	    CBC Full  -  ( 03 Aug 2022 01:13 )  WBC Count : 9.87 K/uL  Hemoglobin : 16.5 g/dL  Hematocrit : 48.6 %  Platelet Count - Automated : 226 K/uL  Mean Cell Volume : 90.2 fl  Mean Cell Hemoglobin : 30.6 pg  Mean Cell Hemoglobin Concentration : 34.0 gm/dL  Auto Neutrophil # : x  Auto Lymphocyte # : x  Auto Monocyte # : x  Auto Eosinophil # : x  Auto Basophil # : x  Auto Neutrophil % : x  Auto Lymphocyte % : x  Auto Monocyte % : x  Auto Eosinophil % : x  Auto Basophil % : x    08-03    141  |  101  |  17  ----------------------------<  106<H>  3.8   |  28  |  1.22  08-02    140  |  100  |  17  ----------------------------<  121<H>  4.0   |  34<H>  |  1.39<H>    Ca    9.0      03 Aug 2022 01:13  Ca    9.2      02 Aug 2022 06:25  Phos  3.0     08-02  Mg     2.3     08-02        proBNP:   Lipid Profile:   HgA1c:   TSH:       CARDIAC MARKERS:                TELEMETRY: 	    ECG:  	  RADIOLOGY:  OTHER: 	    PREVIOUS DIAGNOSTIC TESTING:    Echocardiogram:    Catheterization:    Stress Test:  	  	  ASSESSMENT/PLAN: 	     CHIEF COMPLAINT: Shortness of Breath and Chest Tightness    HISTORY OF PRESENT ILLNESS:  57M with PMHx of PVCs (Dr. Taylor), tinnitus and anxiety related to tinnitus presents with acute shortness of breath and chest tightness on Monday night 8/1. Chest tightness substernal, non-radiating, unlike pain before. Shortness of breath was at rest, unable to lay flat, unresolved after 10 hours at which point he presented to [facility]. Additionally with bilateral leg swelling for last three months but otherwise asymptomatic.     On presentation was found to be in afib and ADHF, provided cardizem, metoprolol, lasix and entresto. Eliquis not started for LHC and RHC completed yesterday 8/2. ACS ruled out. Transferred to [facility] for LHC and RHC. Cath with non-obstructive triple vessel disease, RCA ectasia, elevated PCWP 28, elevated right heart pressure mRA 18mm V-wave 21mm, post-capillary pulmonary hypertension sPAP 54mmHg. TTE was limited by body habitus and with possible decreased LVEF of approximately 40%, other chambers were not able to be evaluated. TSH wnl.     Patient has no history of atrial fibrillation. Has known to have PVCs since his 20s, has seen Dr. Taylor.     Allergies  No Known Allergies  Intolerances    MEDICATIONS:  apixaban 5 milliGRAM(s) Oral every 12 hours  metoprolol succinate  milliGRAM(s) Oral daily  ALPRAZolam 0.25 milliGRAM(s) Oral daily    PAST MEDICAL & SURGICAL HISTORY:  Tinnitus  At risk for sleep apnea  Class 2 obesity with body mass index (BMI) of 38.0 to 38.9 in adult  GERD (gastroesophageal reflux disease)  Ventral hernia  Cardiac arrhythmia  Frequent PVCs  History of nasal surgery  2008    FAMILY HISTORY:  Family history of heart disease (Father)  Family history of lymphoma (Mother)    SOCIAL HISTORY:    [ ]Non-smoker  [ ] Smoker  [ ] Alcohol    REVIEW OF SYSTEMS:  See HPI. Otherwise, 10 point ROS done and otherwise negative.    PHYSICAL EXAM:  T(C): 36.4 (08-03-22 @ 09:30), Max: 36.7 (08-02-22 @ 11:30)  HR: 108 (08-03-22 @ 09:30) (96 - 117)  BP: 118/99 (08-03-22 @ 09:30) (100/69 - 130/94)  RR: 18 (08-03-22 @ 09:30) (16 - 19)  SpO2: 96% (08-03-22 @ 09:30) (95% - 100%)  Wt(kg): --  I&O's Summary    Appearance: Alert. NAD	  HEENT:   NC/AT	  Cardiovascular: +S1S2 irregular no m/g/r  Respiratory: Mild crackles bilateral  Psychiatry: A & O x 3, Mood & affect appropriate  Gastrointestinal:  Soft, NT.ND., + BS	  Skin: No rashes	  Neurologic: Non-focal  Extremities: Trace edema BLE, slightly erythematous  Vascular: Peripheral pulses palpable 2+ bilaterally      LABS:	 	  CBC Full  -  ( 03 Aug 2022 01:13 )  WBC Count : 9.87 K/uL  Hemoglobin : 16.5 g/dL  Hematocrit : 48.6 %  Platelet Count - Automated : 226 K/uL  Mean Cell Volume : 90.2 fl  Mean Cell Hemoglobin : 30.6 pg  Mean Cell Hemoglobin Concentration : 34.0 gm/dL    08-03    141  |  101  |  17  ----------------------------<  106<H>  3.8   |  28  |  1.22  08-02    140  |  100  |  17  ----------------------------<  121<H>  4.0   |  34<H>  |  1.39<H>    Ca    9.0      03 Aug 2022 01:13  Ca    9.2      02 Aug 2022 06:25  Phos  3.0     08-02  Mg     2.3     08-02    TSH: wnl    CARDIAC MARKERS:  Troponin: wnl    TELEMETRY: Atrial fibrillation with PVCs	    ECG: Atrial fibrillation with PVCs  RADIOLOGY: CTPA negative for clot  OTHER: 	    PREVIOUS DIAGNOSTIC TESTING:    Echocardiogram:    Catheterization:    Stress Test:  	  	  ASSESSMENT/PLAN: 	     CHIEF COMPLAINT: Shortness of Breath and Chest Tightness    HISTORY OF PRESENT ILLNESS:  57M with PMHx of PVCs (Dr. Taylor), tinnitus and anxiety related to tinnitus presents with acute shortness of breath and chest tightness on Monday night 8/1. Chest tightness substernal, non-radiating, unlike pain before. Shortness of breath was at rest, unable to lay flat, unresolved after 10 hours at which point he presented to [facility]. Additionally with bilateral leg swelling for last three months but otherwise asymptomatic.     On presentation was found to be in afib and ADHF, provided cardizem, metoprolol, lasix and entresto. Eliquis not started for LHC and RHC completed yesterday 8/2. ACS ruled out. Transferred to [facility] for LHC and RHC. Cath with non-obstructive triple vessel disease, RCA ectasia, elevated PCWP 28, elevated right heart pressure mRA 18mm V-wave 21mm, post-capillary pulmonary hypertension sPAP 54mmHg. TTE was limited by body habitus and with possible decreased LVEF of approximately 40%, other chambers were not able to be evaluated. TSH wnl. CTPA negative.    Patient has no history of atrial fibrillation. Has known to have PVCs since his 20s, has seen Dr. Taylor.     Allergies  No Known Allergies  Intolerances    MEDICATIONS:  apixaban 5 milliGRAM(s) Oral every 12 hours  metoprolol succinate  milliGRAM(s) Oral daily  ALPRAZolam 0.25 milliGRAM(s) Oral daily    PAST MEDICAL & SURGICAL HISTORY:  Tinnitus  At risk for sleep apnea  Class 2 obesity with body mass index (BMI) of 38.0 to 38.9 in adult  GERD (gastroesophageal reflux disease)  Ventral hernia  Cardiac arrhythmia  Frequent PVCs  History of nasal surgery  2008    FAMILY HISTORY:  Family history of heart disease (Father)  Family history of lymphoma (Mother)    SOCIAL HISTORY:    [ ]Non-smoker  [ ] Smoker  [ ] Alcohol    REVIEW OF SYSTEMS:  See HPI. Otherwise, 10 point ROS done and otherwise negative.    PHYSICAL EXAM:  T(C): 36.4 (08-03-22 @ 09:30), Max: 36.7 (08-02-22 @ 11:30)  HR: 108 (08-03-22 @ 09:30) (96 - 117)  BP: 118/99 (08-03-22 @ 09:30) (100/69 - 130/94)  RR: 18 (08-03-22 @ 09:30) (16 - 19)  SpO2: 96% (08-03-22 @ 09:30) (95% - 100%)  Wt(kg): --  I&O's Summary    Appearance: Alert. NAD	  HEENT:   NC/AT	  Cardiovascular: +S1S2 irregular no m/g/r  Respiratory: Mild crackles bilateral  Psychiatry: A & O x 3, Mood & affect appropriate  Gastrointestinal:  Soft, NT.ND., + BS	  Skin: No rashes	  Neurologic: Non-focal  Extremities: Trace edema BLE, slightly erythematous  Vascular: Peripheral pulses palpable 2+ bilaterally      LABS:	 	  CBC Full  -  ( 03 Aug 2022 01:13 )  WBC Count : 9.87 K/uL  Hemoglobin : 16.5 g/dL  Hematocrit : 48.6 %  Platelet Count - Automated : 226 K/uL  Mean Cell Volume : 90.2 fl  Mean Cell Hemoglobin : 30.6 pg  Mean Cell Hemoglobin Concentration : 34.0 gm/dL    08-03    141  |  101  |  17  ----------------------------<  106<H>  3.8   |  28  |  1.22  08-02    140  |  100  |  17  ----------------------------<  121<H>  4.0   |  34<H>  |  1.39<H>    Ca    9.0      03 Aug 2022 01:13  Ca    9.2      02 Aug 2022 06:25  Phos  3.0     08-02  Mg     2.3     08-02    TSH: wnl    CARDIAC MARKERS:  Troponin: wnl    TELEMETRY: Atrial fibrillation with PVCs	    ECG: Atrial fibrillation with PVCs  RADIOLOGY: CTPA negative for clot  OTHER: 	    PREVIOUS DIAGNOSTIC TESTING:    Echocardiogram:    Catheterization:    Stress Test:  	  	  ASSESSMENT/PLAN: 	     CHIEF COMPLAINT: Shortness of Breath and Chest Tightness    HISTORY OF PRESENT ILLNESS:  57M with PMHx of PVCs (Dr. Taylor), tinnitus and anxiety related to tinnitus presents with acute shortness of breath and chest tightness on Monday night . Chest tightness substernal, non-radiating, unlike pain before. Shortness of breath was at rest, unable to lay flat, unresolved after 10 hours at which point he presented to [facility]. Additionally with bilateral leg swelling for last three months but otherwise asymptomatic.     On presentation was found to be in afib and ADHF, provided cardizem, metoprolol, lasix and entresto. Eliquis not started for LHC and RHC completed yesterday . ACS ruled out. Transferred to [facility] for LHC and RHC. Cath with non-obstructive triple vessel disease, RCA ectasia, elevated PCWP 28, elevated right heart pressure mRA 18mm V-wave 21mm, post-capillary pulmonary hypertension sPAP 54mmHg. TTE was limited by body habitus and with possible decreased LVEF of approximately 40%, other chambers were not able to be evaluated. TSH wnl. CTPA negative.    Patient has no history of atrial fibrillation. Has known to have PVCs since his 20s, has seen Dr. Taylor.     Allergies  No Known Allergies  Intolerances    MEDICATIONS:  apixaban 5 milliGRAM(s) Oral every 12 hours  metoprolol succinate  milliGRAM(s) Oral daily  ALPRAZolam 0.25 milliGRAM(s) Oral daily    PAST MEDICAL & SURGICAL HISTORY:  Tinnitus  At risk for sleep apnea  Class 2 obesity with body mass index (BMI) of 38.0 to 38.9 in adult  GERD (gastroesophageal reflux disease)  Ventral hernia  Cardiac arrhythmia  Frequent PVCs  History of nasal surgery      FAMILY HISTORY:  Family history of heart disease (Father)  Family history of lymphoma (Mother)    SOCIAL HISTORY:    [ ]Non-smoker  [ ] Smoker  [ ] Alcohol    REVIEW OF SYSTEMS:  See HPI. Otherwise, 10 point ROS done and otherwise negative.    PHYSICAL EXAM:  T(C): 36.4 (22 @ 09:30), Max: 36.7 (22 @ 11:30)  HR: 108 (22 @ 09:30) (96 - 117)  BP: 118/99 (22 @ 09:30) (100/69 - 130/94)  RR: 18 (22 @ 09:30) (16 - 19)  SpO2: 96% (22 @ 09:30) (95% - 100%)  Wt(kg): --  I&O's Summary    Appearance: Alert. NAD	  HEENT:   NC/AT	  Cardiovascular: +S1S2 irregular no m/g/r  Respiratory: Mild crackles bilateral  Psychiatry: A & O x 3, Mood & affect appropriate  Gastrointestinal:  Soft, NT.ND., + BS	  Skin: No rashes	  Neurologic: Non-focal  Extremities: Trace edema BLE, slightly erythematous  Vascular: Peripheral pulses palpable 2+ bilaterally      LABS:	 	  CBC Full  -  ( 03 Aug 2022 01:13 )  WBC Count : 9.87 K/uL  Hemoglobin : 16.5 g/dL  Hematocrit : 48.6 %  Platelet Count - Automated : 226 K/uL  Mean Cell Volume : 90.2 fl  Mean Cell Hemoglobin : 30.6 pg  Mean Cell Hemoglobin Concentration : 34.0 gm/dL        141  |  101  |  17  ----------------------------<  106<H>  3.8   |  28  |  1.22      140  |  100  |  17  ----------------------------<  121<H>  4.0   |  34<H>  |  1.39<H>    Ca    9.0      03 Aug 2022 01:13  Ca    9.2      02 Aug 2022 06:25  Phos  3.0     08  Mg     2.3         TSH: wnl    CARDIAC MARKERS:  Troponin: wnl    TELEMETRY: Atrial fibrillation with PVCs	    ECG: Atrial fibrillation with PVCs  RADIOLOGY: CTPA negative for clot  OTHER: 	    PREVIOUS DIAGNOSTIC TESTING:    Echocardiogram:  2D AND M-MODE MEASUREMENTS (normal ranges within parentheses):  Left Ventricle:                  Normal   Aorta/Left Atrium:               Normal  IVSd (2D):              1.39 cm (0.7-1.1) Aortic Root (Mmode): 3.17 cm   (2.4-3.7)  LVPWd (2D):             1.07 cm (0.7-1.1) AoV Cusp Separation: 2.02 cm   (1.5-2.6)  LVIDd (2D):             5.06 cm (3.4-5.7) Left Atrium (Mmode): 4.00 cm   (1.9-4.0)  LVIDs (2D):             4.13 cm  LV FS (2D):             18.4 %   (>25%)  LV EF (2D):              38 %    (>55%)  Relative Wall Thickness  0.42    (<0.42)    LV DIASTOLIC FUNCTION:  MV Peak E: 0.85 m/s Decel Time: 173 msec  MV Peak A: 0.25 m/s  E/A Ratio: 3.42    SPECTRAL DOPPLER ANALYSIS (where applicable):  Mitral Valve:  MV P1/2 Time: 50.27 msec  MV Area, PHT: 4.38 cm²    Aortic Valve: AoV Max Solo: 1.59 m/s AoV Peak PG: 10.1 mmHg AoV Mean P.7 mmHg    LVOT Vmax: 0.98 m/s LVOT VTI: 0.191 m LVOT Diameter: 2.23 cm    AoV Area, Vmax: 2.41 cm² AoV Area, VTI: 2.44 cm² AoV Area, Vmn: 2.69 cm²  Ao VTI: 0.306    PHYSICIAN INTERPRETATION:  Left Ventricle: The left ventricular internal cavity size is normal. Left   ventricular wall thickness is increased.  Assessment of left ventricle regional wall motion and left ventricle   ejection fraction is limited due to poor endocardial visualization.   Consider use of IV echo contrast to better evaluate endocardium, if   clinically indicated. The left ventricle systolic function appears at   least moderately reduced, estimated LVEF    40%.  Right Ventricle: The right ventricle was not well visualized. Possible   moderator band visualized in the right ventricle.  Left Atrium: The left atrium was not well visualized.  Right Atrium: The right atrium was not well visualized.  Pericardium: The pericardium was not well visualized.  Mitral Valve: There is mild mitral annular calcification. There appears   to be some degree of mitral regurgitation, further assessment limited by   poor visualization of mitral valve leaflets.  Tricuspid Valve: The tricuspid valve is not well visualized. Adequate TR   velocity was not obtained to accurately assess RVSP.  Aortic Valve: The aortic valve leaflets are not well visualized, appears   to have calcification. Suboptimal Doppler assessments of aortic valve   velocities limits further evaluation.  Pulmonic Valve: The pulmonic valve was not well visualized.  Aorta: The aortic root is normal in size and structure.  Venous: The inferior vena cava is not well visualized.    Summary:   1. Technically difficult study with poor endocardial visualization.   2. Assessment of left ventricle regional wall motion and left ventricle   ejection fraction is limited due to poor endocardial visualization.   Consider use of IV echo contrast to better evaluate endocardium, if   clinically indicated. The left ventricle systolic function appears at   least moderately reduced, estimated LVEF    40%.   3. Normal left ventricular internal cavity size.   4. Increased LV wall thickness.   5. Possible moderator band visualized in the right ventricle.   6. Mild mitral annular calcification.   7. There appears to be some degree of mitral regurgitation, further   assessment limited by poor visualization of mitral valve leaflets.   8. The aortic valve leaflets are not well visualized, appears to have   calcification. Suboptimal Doppler assessments of aortic valve velocities   limits further evaluation.   9. The pericardium was not well visualized.    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  Right dominant system   Elevated right heart pressure (mRA 18mmHg with a V wave of 21mmHg)   Moderate-severe post-capillary pulmonary hypertension (sPAP 54mmHg,  dPAP 38mmHg, mPAP 44mmHg)  PCWP = 28mmHg with a V wave of 30mmHg   LVEDP = 24mmHg   No significant gradient across the aortic valve   PAsat = 66.4% // AOsat = 97.4%   Isaias CO // CI = 5.08l/min // 1.83l/min/m2     Recommendations:   Keep right arm straight for 4 hours following removal of sheaths   Continue aggressive medical management   Recommend patient be evaluated by electrophysiology team     Diagnostic Findings:   Coronary Angiography   LAD   Proximal left anterior descending: There is a 25 % stenosis. First  diagonal: There is a 40 % stenosis in the ostium portion of  the segment.      CX   Proximal circumflex: There is a 25 % stenosis. First obtuse marginal:  There is a 55 % stenosis in the ostium portion of the  segment. Second obtuse marginal: There is a 30 % stenosis. Distal  circumflex: There is a 20 % stenosis.    RCA   Proximal right coronary artery: The segment is moderately ectatic.  There is a 15 % stenosis. Distal right coronary artery: There  is a 50 % stenosis.    	     CHIEF COMPLAINT: Shortness of Breath and Chest Tightness    HISTORY OF PRESENT ILLNESS:  57M with PMHx of PVCs (Dr. Taylor), tinnitus and anxiety related to tinnitus presents with acute shortness of breath and chest tightness on Monday night . Chest tightness substernal, non-radiating, unlike pain before. Shortness of breath was at rest, unable to lay flat, unresolved after 10 hours at which point he presented to [facility]. Additionally with bilateral leg swelling for last three months but otherwise asymptomatic.     On presentation was found to be in afib and ADHF, provided cardizem, metoprolol, lasix and entresto. Eliquis not started for LHC and RHC completed yesterday . ACS ruled out. Transferred to [facility] for LHC and RHC. Cath with non-obstructive triple vessel disease, RCA ectasia, elevated PCWP 28, elevated right heart pressure mRA 18mm V-wave 21mm, post-capillary pulmonary hypertension sPAP 54mmHg. TTE was limited by body habitus and with possible decreased LVEF of approximately 40%, other chambers were not able to be evaluated. TSH wnl. CTPA negative.    Patient has no history of atrial fibrillation. Has known to have PVCs since his 20s, has seen Dr. Taylor. Does snore at night.    Allergies  No Known Allergies  Intolerances    MEDICATIONS:  apixaban 5 milliGRAM(s) Oral every 12 hours  metoprolol succinate  milliGRAM(s) Oral daily  ALPRAZolam 0.25 milliGRAM(s) Oral daily    PAST MEDICAL & SURGICAL HISTORY:  Tinnitus  At risk for sleep apnea  Class 2 obesity with body mass index (BMI) of 38.0 to 38.9 in adult  GERD (gastroesophageal reflux disease)  Ventral hernia  Cardiac arrhythmia  Frequent PVCs  History of nasal surgery      FAMILY HISTORY:  Family history of heart disease (Father)  Family history of lymphoma (Mother)    SOCIAL HISTORY:    [x ] Non-smoker  [x ] Alcohol - 2 glasses of wine/week    REVIEW OF SYSTEMS:  See HPI. Otherwise, 10 point ROS done and otherwise negative.    PHYSICAL EXAM:  T(C): 36.4 (22 @ 09:30), Max: 36.7 (22 @ 11:30)  HR: 108 (22 @ 09:30) (96 - 117)  BP: 118/99 (22 @ 09:30) (100/69 - 130/94)  RR: 18 (22 @ 09:30) (16 - 19)  SpO2: 96% (22 @ 09:30) (95% - 100%)  Wt(kg): --  I&O's Summary    Appearance: Alert. NAD	  HEENT:   NC/AT	  Cardiovascular: +S1S2 irregular no m/g/r  Respiratory: Mild crackles bilateral  Psychiatry: A & O x 3, Mood & affect appropriate  Gastrointestinal:  Soft, NT.ND., + BS	  Skin: No rashes	  Neurologic: Non-focal  Extremities: Trace edema BLE, slightly erythematous  Vascular: Peripheral pulses palpable 2+ bilaterally      LABS:	 	  CBC Full  -  ( 03 Aug 2022 01:13 )  WBC Count : 9.87 K/uL  Hemoglobin : 16.5 g/dL  Hematocrit : 48.6 %  Platelet Count - Automated : 226 K/uL  Mean Cell Volume : 90.2 fl  Mean Cell Hemoglobin : 30.6 pg  Mean Cell Hemoglobin Concentration : 34.0 gm/dL        141  |  101  |  17  ----------------------------<  106<H>  3.8   |  28  |  1.22  08    140  |  100  |  17  ----------------------------<  121<H>  4.0   |  34<H>  |  1.39<H>    Ca    9.0      03 Aug 2022 01:13  Ca    9.2      02 Aug 2022 06:25  Phos  3.0     08-  Mg     2.3     -    TSH: wnl    CARDIAC MARKERS:  Troponin: wnl    TELEMETRY: Atrial fibrillation with PVCs	    ECG: Atrial fibrillation with PVCs  RADIOLOGY: CTPA negative for clot  OTHER: 	    PREVIOUS DIAGNOSTIC TESTING:    Echocardiogram:  2D AND M-MODE MEASUREMENTS (normal ranges within parentheses):  Left Ventricle:                  Normal   Aorta/Left Atrium:               Normal  IVSd (2D):              1.39 cm (0.7-1.1) Aortic Root (Mmode): 3.17 cm   (2.4-3.7)  LVPWd (2D):             1.07 cm (0.7-1.1) AoV Cusp Separation: 2.02 cm   (1.5-2.6)  LVIDd (2D):             5.06 cm (3.4-5.7) Left Atrium (Mmode): 4.00 cm   (1.9-4.0)  LVIDs (2D):             4.13 cm  LV FS (2D):             18.4 %   (>25%)  LV EF (2D):              38 %    (>55%)  Relative Wall Thickness  0.42    (<0.42)    LV DIASTOLIC FUNCTION:  MV Peak E: 0.85 m/s Decel Time: 173 msec  MV Peak A: 0.25 m/s  E/A Ratio: 3.42    SPECTRAL DOPPLER ANALYSIS (where applicable):  Mitral Valve:  MV P1/2 Time: 50.27 msec  MV Area, PHT: 4.38 cm²    Aortic Valve: AoV Max Solo: 1.59 m/s AoV Peak PG: 10.1 mmHg AoV Mean P.7 mmHg    LVOT Vmax: 0.98 m/s LVOT VTI: 0.191 m LVOT Diameter: 2.23 cm    AoV Area, Vmax: 2.41 cm² AoV Area, VTI: 2.44 cm² AoV Area, Vmn: 2.69 cm²  Ao VTI: 0.306    PHYSICIAN INTERPRETATION:  Left Ventricle: The left ventricular internal cavity size is normal. Left   ventricular wall thickness is increased.  Assessment of left ventricle regional wall motion and left ventricle   ejection fraction is limited due to poor endocardial visualization.   Consider use of IV echo contrast to better evaluate endocardium, if   clinically indicated. The left ventricle systolic function appears at   least moderately reduced, estimated LVEF    40%.  Right Ventricle: The right ventricle was not well visualized. Possible   moderator band visualized in the right ventricle.  Left Atrium: The left atrium was not well visualized.  Right Atrium: The right atrium was not well visualized.  Pericardium: The pericardium was not well visualized.  Mitral Valve: There is mild mitral annular calcification. There appears   to be some degree of mitral regurgitation, further assessment limited by   poor visualization of mitral valve leaflets.  Tricuspid Valve: The tricuspid valve is not well visualized. Adequate TR   velocity was not obtained to accurately assess RVSP.  Aortic Valve: The aortic valve leaflets are not well visualized, appears   to have calcification. Suboptimal Doppler assessments of aortic valve   velocities limits further evaluation.  Pulmonic Valve: The pulmonic valve was not well visualized.  Aorta: The aortic root is normal in size and structure.  Venous: The inferior vena cava is not well visualized.    Summary:   1. Technically difficult study with poor endocardial visualization.   2. Assessment of left ventricle regional wall motion and left ventricle   ejection fraction is limited due to poor endocardial visualization.   Consider use of IV echo contrast to better evaluate endocardium, if   clinically indicated. The left ventricle systolic function appears at   least moderately reduced, estimated LVEF    40%.   3. Normal left ventricular internal cavity size.   4. Increased LV wall thickness.   5. Possible moderator band visualized in the right ventricle.   6. Mild mitral annular calcification.   7. There appears to be some degree of mitral regurgitation, further   assessment limited by poor visualization of mitral valve leaflets.   8. The aortic valve leaflets are not well visualized, appears to have   calcification. Suboptimal Doppler assessments of aortic valve velocities   limits further evaluation.   9. The pericardium was not well visualized.    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  Right dominant system   Elevated right heart pressure (mRA 18mmHg with a V wave of 21mmHg)   Moderate-severe post-capillary pulmonary hypertension (sPAP 54mmHg,  dPAP 38mmHg, mPAP 44mmHg)  PCWP = 28mmHg with a V wave of 30mmHg   LVEDP = 24mmHg   No significant gradient across the aortic valve   PAsat = 66.4% // AOsat = 97.4%   Isaias CO // CI = 5.08l/min // 1.83l/min/m2     Recommendations:   Keep right arm straight for 4 hours following removal of sheaths   Continue aggressive medical management   Recommend patient be evaluated by electrophysiology team     Diagnostic Findings:   Coronary Angiography   LAD   Proximal left anterior descending: There is a 25 % stenosis. First  diagonal: There is a 40 % stenosis in the ostium portion of  the segment.      CX   Proximal circumflex: There is a 25 % stenosis. First obtuse marginal:  There is a 55 % stenosis in the ostium portion of the  segment. Second obtuse marginal: There is a 30 % stenosis. Distal  circumflex: There is a 20 % stenosis.    RCA   Proximal right coronary artery: The segment is moderately ectatic.  There is a 15 % stenosis. Distal right coronary artery: There  is a 50 % stenosis.

## 2022-08-03 NOTE — CONSULT NOTE ADULT - SUBJECTIVE AND OBJECTIVE BOX
Patient is a 57y old  Male who presents with a chief complaint of     HPI:  56 yo M with no significant PMhx PRESENTED TO [facility] on 8/1    see H&P below from GC :      57M with hx of PVCs, tinnitus and anxiety, who presents for shortness of breath x 5 days, worse with exertion, gradual onset. However, today, was associated with new onset chest pain, described as "tight", 8/10, substernal, non-radiating, + diaphoretic, but WITHOUT headache, N/V, arm pain, jaw pain. Patient states he did heavy weightlifting 3 days ago. Also, travelled to [facility] 3 weeks ago (was on a plane ride). Does have chronic leg swelling x 4 months - had "extensive" testing by vascular surgery (Dr. Tidwell) - all testing negative, including arterial dopplers, vein mapping and ultrasound. No calf pain.     He was found to be in Afib and was given cardizem and started on metoprolol. He was started on eliquis . He was given lasix which relieved his chest tightness AND ENTRESTO was also started at [facility].     Patient had an ECHO on 8/1  that revealed :   1.   Technically difficult study with poor endocardial visualization.   2. Assessment of left ventricle regional wall motion and left ventricle   ejection fraction is limited due to poor endocardial visualization.   Consider use of IV echo contrast to better evaluate endocardium, if   clinically indicated. The left ventricle systolic function appears at   least moderately reduced, estimated LVEF    40%.   3. Normal left ventricular internal cavity size.   4. Increased LV wall thickness.   5. Possible moderator band visualized in the right ventricle.   6. Mild mitral annular calcification.   7. There appears to be some degree of mitral regurgitation, further   assessment limited by poor visualization of mitral valve leaflets.   8. The aortic valve leaflets are not well visualized, appears to have   calcification. Suboptimal Doppler assessments of aortic valve velocities   limits further evaluation.   9. The pericardium was not well visualized.      8/1 CTPA - negative for PE .     No fever or chills, no N/V/D or abd pain . He was transferred here for cardiac catheterization .  (02 Aug 2022 11:45)      PAST MEDICAL & SURGICAL HISTORY:  Tinnitus      At risk for sleep apnea      Class 2 obesity with body mass index (BMI) of 38.0 to 38.9 in adult      GERD (gastroesophageal reflux disease)      Ventral hernia      Cardiac arrhythmia      Frequent PVCs      History of nasal surgery  2008                ECHO  FINDINGS:      MEDICATIONS  (STANDING):  ALPRAZolam 0.25 milliGRAM(s) Oral daily  apixaban 5 milliGRAM(s) Oral every 12 hours  metoprolol succinate  milliGRAM(s) Oral daily    MEDICATIONS  (PRN):      FAMILY HISTORY:  Family history of heart disease (Father)    Family history of lymphoma (Mother)            REVIEW OF SYSTEMS    General:	  Skin/Breast:  Ophthalmologic:  ENMT:	  Respiratory and Thorax:  Cardiovascular:	  Gastrointestinal:	  Genitourinary:	  Musculoskeletal:	  Neurological:	  Psychiatric:	  Hematology/Lymphatics:	  Endocrine:	  Allergic/Immunologic:	    SOCIAL HISTORY:    CIGARETTES:    ALCOHOL:  Vital Signs Last 24 Hrs  T(C): 36.4 (03 Aug 2022 09:30), Max: 36.7 (02 Aug 2022 18:00)  T(F): 97.6 (03 Aug 2022 09:30), Max: 98 (02 Aug 2022 18:00)  HR: 108 (03 Aug 2022 09:30) (96 - 117)  BP: 118/99 (03 Aug 2022 09:30) (100/69 - 126/90)  BP(mean): 91 (03 Aug 2022 04:54) (91 - 119)  RR: 18 (03 Aug 2022 09:30) (18 - 19)  SpO2: 96% (03 Aug 2022 09:30) (95% - 100%)    Parameters below as of 03 Aug 2022 09:30  Patient On (Oxygen Delivery Method): room air        PHYSICAL EXAM:    Constitutional:  Eyes:  ENMT:  Neck:  Breasts:  Back:  Respiratory:  Cardiovascular:  Gastrointestinal:  Genitourinary:  Rectal:  Extremities:  Vascular:  Neurological:  Skin:  Lymph Nodes:  Musculoskeletal:  Psychiatric:        ECG:    I&O's Detail      LABS:                        16.5   9.87  )-----------( 226      ( 03 Aug 2022 01:13 )             48.6     08-03    141  |  101  |  17  ----------------------------<  106<H>  3.8   |  28  |  1.22    Ca    9.0      03 Aug 2022 01:13  Phos  3.0     08-02  Mg     2.3     08-02              I&O's Summary    BNP  RADIOLOGY & ADDITIONAL STUDIES: Patient is a 57y old  Male who presents with a chief complaint of     HPI:  58 yo M with no significant PMhx PRESENTED TO [facility] on 8/1    see H&P below from GC :      57M with hx of PVCs, tinnitus and anxiety, who presents for shortness of breath x 5 days, worse with exertion, gradual onset. However, today, was associated with new onset chest pain, described as "tight", 8/10, substernal, non-radiating, + diaphoretic, but WITHOUT headache, N/V, arm pain, jaw pain. Patient states he did heavy weightlifting 3 days ago. Also, travelled to [facility] 3 weeks ago (was on a plane ride). Does have chronic leg swelling x 4 months - had "extensive" testing by vascular surgery (Dr. Tidwell) - all testing negative, including arterial dopplers, vein mapping and ultrasound. No calf pain.     He was found to be in Afib and was given cardizem and started on metoprolol. He was started on eliquis . He was given lasix which relieved his chest tightness AND ENTRESTO was also started at [facility]. No fever or chills, no N/V/D or abd pain . He was transferred here for cardiac catheterization .     Interval - Pt was seen and examined at bedside this morning. Pt endorses "mild chest discomfort" that is a 3 out of 10 and significantly better from admission. The substernal chest discomfort is non-radiating, "pressure-like" and accompanied with mild palpitations overnight. Denies SOB, headache, nausea, vomiting, dizziness, blurry vision, changes in BM or dysuria.       PAST MEDICAL & SURGICAL HISTORY:  Tinnitus    At risk for sleep apnea    Class 2 obesity with body mass index (BMI) of 38.0 to 38.9 in adult    GERD (gastroesophageal reflux disease)    Ventral hernia    Cardiac arrhythmia    Frequent PVCs    History of nasal surgery  2008    MEDICATIONS  (STANDING):  ALPRAZolam 0.25 milliGRAM(s) Oral daily  apixaban 5 milliGRAM(s) Oral every 12 hours  metoprolol succinate  milliGRAM(s) Oral daily    MEDICATIONS  (PRN):      FAMILY HISTORY:  Family history of heart disease (Father)    Family history of lymphoma (Mother)    REVIEW OF SYSTEMS  General: no fevers or chills  	  Ophthalmologic: no blurry vision   ENMT:+ tinnitus   Respiratory and Thorax: no SOB or cough  Cardiovascular:	+ mild chest discomfort   Gastrointestinal:	no abdominal pain   Neurological: no focal deficits 	  Psychiatric: normal affect 	    SOCIAL HISTORY: Lives in a house in [place] with wife and child. Fairly active (exercises 3-4x/wk). Works as a .     CIGARETTES: Denies     ALCOHOL: Occasional     Vital Signs Last 24 Hrs  T(C): 36.4 (03 Aug 2022 09:30), Max: 36.7 (02 Aug 2022 18:00)  T(F): 97.6 (03 Aug 2022 09:30), Max: 98 (02 Aug 2022 18:00)  HR: 108 (03 Aug 2022 09:30) (96 - 117)  BP: 118/99 (03 Aug 2022 09:30) (100/69 - 126/90)  BP(mean): 91 (03 Aug 2022 04:54) (91 - 119)  RR: 18 (03 Aug 2022 09:30) (18 - 19)  SpO2: 96% (03 Aug 2022 09:30) (95% - 100%)    Parameters below as of 03 Aug 2022 09:30  Patient On (Oxygen Delivery Method): room air      PHYSICAL EXAM:  Constitutional: obese male, in no apparent distress    Eyes: no sclera icterus   Neck: No JVD   Respiratory: no wheezing   Cardiovascular: irregularly irregular w. b/l crackles at bases   Gastrointestinal: soft, non-distended, non-tender   Extremities: 2+ pitting edema LE b/l  Vascular: 2+ pulses b/l   Neurological: no focal deficits   Skin: no rashes or lesions   Musculoskeletal: no weakness  Psychiatric: normal affect         ECG:    I&O's Detail      LABS:                        16.5   9.87  )-----------( 226      ( 03 Aug 2022 01:13 )             48.6     08-03    141  |  101  |  17  ----------------------------<  106<H>  3.8   |  28  |  1.22    Ca    9.0      03 Aug 2022 01:13  Phos  3.0     08-02  Mg     2.3     08-02              I&O's Summary    BNP  RADIOLOGY & ADDITIONAL STUDIES:    Patient had an ECHO on 8/1  that revealed :   1.   Technically difficult study with poor endocardial visualization.   2. Assessment of left ventricle regional wall motion and left ventricle   ejection fraction is limited due to poor endocardial visualization.   Consider use of IV echo contrast to better evaluate endocardium, if   clinically indicated. The left ventricle systolic function appears at   least moderately reduced, estimated LVEF    40%.   3. Normal left ventricular internal cavity size.   4. Increased LV wall thickness.   5. Possible moderator band visualized in the right ventricle.   6. Mild mitral annular calcification.   7. There appears to be some degree of mitral regurgitation, further   assessment limited by poor visualization of mitral valve leaflets.   8. The aortic valve leaflets are not well visualized, appears to have   calcification. Suboptimal Doppler assessments of aortic valve velocities   limits further evaluation.   9. The pericardium was not well visualized.    < from: CT Angio Chest PE Protocol w/ IV Cont (08.01.22 @ 10:32) >  IMPRESSION: Questionable trace pericholecystic stranding. Recommend right   upper quadrant ultrasound for further evaluation. No evidence of   pulmonary embolism.    < from: Xray Chest 1 View AP/PA (08.01.22 @ 08:52) >    IMPRESSION: No acute finding or change.    < from: Cardiac Catheterization (08.02.22 @ 17:00) >  Three vessel non-obstructive coronary artery disease   --Ectasia of the right coronary artery   Separate ostium of the left anterior descending artery and left  circumflex artery  Right dominant system   Elevated right heart pressure (mRA 18mmHg with a V wave of 21mmHg)   Moderate-severe post-capillary pulmonary hypertension (sPAP 54mmHg,  dPAP 38mmHg, mPAP 44mmHg)  PCWP = 28mmHg with a V wave of 30mmHg   LVEDP = 24mmHg   No significant gradient across the aortic valve   PAsat = 66.4% // AOsat = 97.4%   Isaias CO // CI = 5.08l/min // 1.83l/min/m2     Coronary Angiography   LAD   Proximal left anterior descending: There is a 25 % stenosis. First  diagonal: There is a 40 % stenosis in the ostium portion of  the segment.      CX   Proximal circumflex: There is a 25 % stenosis. First obtuse marginal:  There is a 55 % stenosis in the ostium portion of the  segment. Second obtuse marginal: There is a 30 % stenosis. Distal  circumflex: There is a 20 % stenosis.    RCA   Proximal right coronary artery: The segment is moderately ectatic.  There is a 15 % stenosis. Distal right coronary artery: There  is a 50 % stenosis.     Patient is a 57y old  Male who presents with a chief complaint of     HPI:  56 yo M with no significant PMhx PRESENTED TO [facility] on 8/1    see H&P below from GC :      57M with hx of PVCs, tinnitus and anxiety, who presents for shortness of breath x 5 days, worse with exertion, gradual onset. However, today, was associated with new onset chest pain, described as "tight", 8/10, substernal, non-radiating, + diaphoretic, but WITHOUT headache, N/V, arm pain, jaw pain. Patient states he did heavy weightlifting 3 days ago. Also, travelled to [facility] 3 weeks ago (was on a plane ride). Does have chronic leg swelling x 4 months - had "extensive" testing by vascular surgery (Dr. Tidwell) - all testing negative, including arterial dopplers, vein mapping and ultrasound. No calf pain.     He was found to be in Afib and was given cardizem and started on metoprolol. He was started on eliquis . He was given lasix which relieved his chest tightness AND ENTRESTO was also started at [facility]. No fever or chills, no N/V/D or abd pain . He was transferred here for cardiac catheterization .     Interval - Pt was seen and examined at bedside this morning. Pt endorses "mild chest discomfort" that is a 3 out of 10 and significantly better from admission. The substernal chest discomfort is non-radiating, "pressure-like" and accompanied with mild palpitations overnight. Denies SOB, headache, nausea, vomiting, dizziness, blurry vision, changes in BM or dysuria.       PAST MEDICAL & SURGICAL HISTORY:  Tinnitus    At risk for sleep apnea    Class 2 obesity with body mass index (BMI) of 38.0 to 38.9 in adult    GERD (gastroesophageal reflux disease)    Ventral hernia    Cardiac arrhythmia    Frequent PVCs    History of nasal surgery  2008    MEDICATIONS  (STANDING):  ALPRAZolam 0.25 milliGRAM(s) Oral daily  apixaban 5 milliGRAM(s) Oral every 12 hours  metoprolol succinate  milliGRAM(s) Oral daily    MEDICATIONS  (PRN):      FAMILY HISTORY:  Family history of heart disease (Father)    Family history of lymphoma (Mother)    REVIEW OF SYSTEMS  General: no fevers or chills  	  Ophthalmologic: no blurry vision   ENMT:+ tinnitus   Respiratory and Thorax: no SOB or cough  Cardiovascular:	+ mild chest discomfort   Gastrointestinal:	no abdominal pain   Neurological: no focal deficits 	  Psychiatric: normal affect 	    SOCIAL HISTORY: Lives in a house in [place] with wife and child. Fairly active (exercises 3-4x/wk). Works as a .     CIGARETTES: Denies     ALCOHOL: Occasional     Vital Signs Last 24 Hrs  T(C): 36.4 (03 Aug 2022 09:30), Max: 36.7 (02 Aug 2022 18:00)  T(F): 97.6 (03 Aug 2022 09:30), Max: 98 (02 Aug 2022 18:00)  HR: 108 (03 Aug 2022 09:30) (96 - 117)  BP: 118/99 (03 Aug 2022 09:30) (100/69 - 126/90)  BP(mean): 91 (03 Aug 2022 04:54) (91 - 119)  RR: 18 (03 Aug 2022 09:30) (18 - 19)  SpO2: 96% (03 Aug 2022 09:30) (95% - 100%)    Parameters below as of 03 Aug 2022 09:30  Patient On (Oxygen Delivery Method): room air      PHYSICAL EXAM:  Constitutional: obese male, in no apparent distress    Eyes: no sclera icterus   Neck: No JVD   Respiratory: no wheezing   Cardiovascular: irregularly irregular w. b/l crackles at bases   Gastrointestinal: soft, non-distended, non-tender   Extremities: 2+ pitting edema LE b/l  Vascular: 2+ pulses b/l   Neurological: no focal deficits   Skin: no rashes or lesions   Musculoskeletal: no weakness  Psychiatric: normal affect         ECG:    I&O's Detail      LABS:                        16.5   9.87  )-----------( 226      ( 03 Aug 2022 01:13 )             48.6     08-03    141  |  101  |  17  ----------------------------<  106<H>  3.8   |  28  |  1.22    Ca    9.0      03 Aug 2022 01:13  Phos  3.0     08-02  Mg     2.3     08-02              I&O's Summary    BNP  RADIOLOGY & ADDITIONAL STUDIES:    < from: SHRADDHA w/TTE (w/Cont) (08.03.22 @ 14:05) >  1. Mildly dilated left atrium.  LA volume index = 36 cc/m2.    Spontaneous echo contrast seen.   No left atrial or left  atrial appendage thrombus.   Decreased left atrial  appendage velocities noted.  2. Mild left ventricular enlargement.  3. Severe global left ventricular systolic dysfunction.  Endocardial visualization enhanced with intravenous  injection of Ultrasonic Enhancing Agent (Lumason).  4. Right ventricular enlargement with decreased right  ventricular systolic function.  5. Estimated pulmonary artery systolic pressure equals 46  mm Hg, assuming right atrial pressure equals 8 mm Hg,  consistent with mild pulmonary pressures.  6. Agitated saline injection and color flow Doppler  demonstrates no evidence of a patent foramen ovale.  *** No previous Echo exam.    Patient had an ECHO on 8/1  that revealed :   1.   Technically difficult study with poor endocardial visualization.   2. Assessment of left ventricle regional wall motion and left ventricle   ejection fraction is limited due to poor endocardial visualization.   Consider use of IV echo contrast to better evaluate endocardium, if   clinically indicated. The left ventricle systolic function appears at   least moderately reduced, estimated LVEF    40%.   3. Normal left ventricular internal cavity size.   4. Increased LV wall thickness.   5. Possible moderator band visualized in the right ventricle.   6. Mild mitral annular calcification.   7. There appears to be some degree of mitral regurgitation, further   assessment limited by poor visualization of mitral valve leaflets.   8. The aortic valve leaflets are not well visualized, appears to have   calcification. Suboptimal Doppler assessments of aortic valve velocities   limits further evaluation.   9. The pericardium was not well visualized.    < from: CT Angio Chest PE Protocol w/ IV Cont (08.01.22 @ 10:32) >  IMPRESSION: Questionable trace pericholecystic stranding. Recommend right   upper quadrant ultrasound for further evaluation. No evidence of   pulmonary embolism.    < from: Xray Chest 1 View AP/PA (08.01.22 @ 08:52) >    IMPRESSION: No acute finding or change.    < from: Cardiac Catheterization (08.02.22 @ 17:00) >  Three vessel non-obstructive coronary artery disease   --Ectasia of the right coronary artery   Separate ostium of the left anterior descending artery and left  circumflex artery  Right dominant system   Elevated right heart pressure (mRA 18mmHg with a V wave of 21mmHg)   Moderate-severe post-capillary pulmonary hypertension (sPAP 54mmHg,  dPAP 38mmHg, mPAP 44mmHg)  PCWP = 28mmHg with a V wave of 30mmHg   LVEDP = 24mmHg   No significant gradient across the aortic valve   PAsat = 66.4% // AOsat = 97.4%   Isaias CO // CI = 5.08l/min // 1.83l/min/m2     Coronary Angiography   LAD   Proximal left anterior descending: There is a 25 % stenosis. First  diagonal: There is a 40 % stenosis in the ostium portion of  the segment.      CX   Proximal circumflex: There is a 25 % stenosis. First obtuse marginal:  There is a 55 % stenosis in the ostium portion of the  segment. Second obtuse marginal: There is a 30 % stenosis. Distal  circumflex: There is a 20 % stenosis.    RCA   Proximal right coronary artery: The segment is moderately ectatic.  There is a 15 % stenosis. Distal right coronary artery: There  is a 50 % stenosis.     Patient is a 57y old  Male who presents with a chief complaint of shortness of breath  HPI:   57M with hx of PVCs, tinnitus and anxiety, who presents for shortness of breath x 5 days, worse with exertion, gradual onset. However, today, was associated with new onset chest pain, described as "tight", 8/10, substernal, non-radiating, + diaphoretic, but WITHOUT headache, N/V, arm pain, jaw pain. Patient states he did heavy weightlifting 3 days ago. Also, travelled to [place] 3 weeks ago (was on a plane ride). Does have chronic leg swelling x 4 months - had "extensive" testing by vascular surgery (Dr. Tidwell) - all testing negative, including arterial dopplers, vein mapping and ultrasound. No calf pain.     He was found to be in Afib and was given cardizem and started on metoprolol. He was started on eliquis . He was given lasix which relieved his chest tightness AND ENTRESTO was also started at [facility]. No fever or chills, no N/V/D or abd pain . He was transferred here for cardiac catheterization .     Interval - Pt was seen and examined at bedside this morning. Pt endorses "mild chest discomfort" that is a 3 out of 10 and significantly better from admission. The substernal chest discomfort is non-radiating, "pressure-like" and accompanied with mild palpitations overnight. Denies SOB, headache, nausea, vomiting, dizziness, blurry vision, changes in BM or dysuria.       PAST MEDICAL & SURGICAL HISTORY:  Tinnitus    At risk for sleep apnea    Class 2 obesity with body mass index (BMI) of 38.0 to 38.9 in adult    GERD (gastroesophageal reflux disease)    Ventral hernia    Cardiac arrhythmia    Frequent PVCs    History of nasal surgery  2008    MEDICATIONS  (STANDING):  ALPRAZolam 0.25 milliGRAM(s) Oral daily  apixaban 5 milliGRAM(s) Oral every 12 hours  metoprolol succinate  milliGRAM(s) Oral daily    MEDICATIONS  (PRN):      FAMILY HISTORY:  Family history of heart disease (Father)    Family history of lymphoma (Mother)    REVIEW OF SYSTEMS  General: no fevers or chills  	  Ophthalmologic: no blurry vision   ENMT:+ tinnitus   Respiratory and Thorax: no SOB or cough  Cardiovascular:	+ mild chest discomfort   Gastrointestinal:	no abdominal pain   Neurological: no focal deficits 	  Psychiatric: normal affect 	    SOCIAL HISTORY: Lives in a house in [place] with wife and child. Fairly active (exercises 3-4x/wk). Works as a .     CIGARETTES: Denies     ALCOHOL: Occasional     Vital Signs Last 24 Hrs  T(C): 36.4 (03 Aug 2022 09:30), Max: 36.7 (02 Aug 2022 18:00)  T(F): 97.6 (03 Aug 2022 09:30), Max: 98 (02 Aug 2022 18:00)  HR: 108 (03 Aug 2022 09:30) (96 - 117)  BP: 118/99 (03 Aug 2022 09:30) (100/69 - 126/90)  BP(mean): 91 (03 Aug 2022 04:54) (91 - 119)  RR: 18 (03 Aug 2022 09:30) (18 - 19)  SpO2: 96% (03 Aug 2022 09:30) (95% - 100%)    Parameters below as of 03 Aug 2022 09:30  Patient On (Oxygen Delivery Method): room air      PHYSICAL EXAM:  Constitutional: obese male, in no apparent distress    Eyes: no sclera icterus   Neck: No JVD   Respiratory: no wheezing   Cardiovascular: irregularly irregular w. b/l crackles at bases   Gastrointestinal: soft, non-distended, non-tender   Extremities: 2+ pitting edema LE b/l  Vascular: 2+ pulses b/l   Neurological: no focal deficits   Skin: no rashes or lesions   Musculoskeletal: no weakness  Psychiatric: normal affect         ECG:    I&O's Detail      LABS:                        16.5   9.87  )-----------( 226      ( 03 Aug 2022 01:13 )             48.6     08-03    141  |  101  |  17  ----------------------------<  106<H>  3.8   |  28  |  1.22    Ca    9.0      03 Aug 2022 01:13  Phos  3.0     08-02  Mg     2.3     08-02              I&O's Summary    BNP  RADIOLOGY & ADDITIONAL STUDIES:    < from: SHRADDHA w/TTE (w/Cont) (08.03.22 @ 14:05) >  1. Mildly dilated left atrium.  LA volume index = 36 cc/m2.    Spontaneous echo contrast seen.   No left atrial or left  atrial appendage thrombus.   Decreased left atrial  appendage velocities noted.  2. Mild left ventricular enlargement.  3. Severe global left ventricular systolic dysfunction.  Endocardial visualization enhanced with intravenous  injection of Ultrasonic Enhancing Agent (Lumason).  4. Right ventricular enlargement with decreased right  ventricular systolic function.  5. Estimated pulmonary artery systolic pressure equals 46  mm Hg, assuming right atrial pressure equals 8 mm Hg,  consistent with mild pulmonary pressures.  6. Agitated saline injection and color flow Doppler  demonstrates no evidence of a patent foramen ovale.  *** No previous Echo exam.    Patient had an ECHO on 8/1  that revealed :   1.   Technically difficult study with poor endocardial visualization.   2. Assessment of left ventricle regional wall motion and left ventricle   ejection fraction is limited due to poor endocardial visualization.   Consider use of IV echo contrast to better evaluate endocardium, if   clinically indicated. The left ventricle systolic function appears at   least moderately reduced, estimated LVEF    40%.   3. Normal left ventricular internal cavity size.   4. Increased LV wall thickness.   5. Possible moderator band visualized in the right ventricle.   6. Mild mitral annular calcification.   7. There appears to be some degree of mitral regurgitation, further   assessment limited by poor visualization of mitral valve leaflets.   8. The aortic valve leaflets are not well visualized, appears to have   calcification. Suboptimal Doppler assessments of aortic valve velocities   limits further evaluation.   9. The pericardium was not well visualized.    < from: CT Angio Chest PE Protocol w/ IV Cont (08.01.22 @ 10:32) >  IMPRESSION: Questionable trace pericholecystic stranding. Recommend right   upper quadrant ultrasound for further evaluation. No evidence of   pulmonary embolism.    < from: Xray Chest 1 View AP/PA (08.01.22 @ 08:52) >    IMPRESSION: No acute finding or change.    < from: Cardiac Catheterization (08.02.22 @ 17:00) >  Three vessel non-obstructive coronary artery disease   --Ectasia of the right coronary artery   Separate ostium of the left anterior descending artery and left  circumflex artery  Right dominant system   Elevated right heart pressure (mRA 18mmHg with a V wave of 21mmHg)   Moderate-severe post-capillary pulmonary hypertension (sPAP 54mmHg,  dPAP 38mmHg, mPAP 44mmHg)  PCWP = 28mmHg with a V wave of 30mmHg   LVEDP = 24mmHg   No significant gradient across the aortic valve   PAsat = 66.4% // AOsat = 97.4%   Isaias CO // CI = 5.08l/min // 1.83l/min/m2     Coronary Angiography   LAD   Proximal left anterior descending: There is a 25 % stenosis. First  diagonal: There is a 40 % stenosis in the ostium portion of  the segment.      CX   Proximal circumflex: There is a 25 % stenosis. First obtuse marginal:  There is a 55 % stenosis in the ostium portion of the  segment. Second obtuse marginal: There is a 30 % stenosis. Distal  circumflex: There is a 20 % stenosis.    RCA   Proximal right coronary artery: The segment is moderately ectatic.  There is a 15 % stenosis. Distal right coronary artery: There  is a 50 % stenosis.

## 2022-08-03 NOTE — CONSULT NOTE ADULT - ASSESSMENT
57Y M w/ PMH of PVCs (Dr. Taylor), tinnitus, and anxiety presenting with SOB and chest tightness on 8/1/22 with Afib on presentation.     #HFrEF (EF: 40%) likely 2/2 non-ischemic cardiomyopathy (GDMT)  LHC: triple vessel non-obstructive CAD w. PCWP = 28; CI = 1.83; SVR = 1400  - Obtain Lactate   - Start Furosemide 40 mg IV QD (Goal Net Negative: 1-2L)  - Monitor strict I/O's   - Entresto 24/26 mg BID vs hydralazine 25 / Isordil 10 Q8H   - C/w Metoprolol succinate 100 mg     #CAD   - Start aspirin 81 mg, atorvastatin 40 mg      #New Onset Atrial Fibrillation   - C/w apixaban and metoprolol   - EP following       57Y M w/ PMH of PVCs (Dr. Taylor), tinnitus, and anxiety presenting with SOB and chest tightness on 8/1/22 with Afib on presentation.     #HFrEF (EF: 40%) likely 2/2 non-ischemic cardiomyopathy (GDMT)  LHC: triple vessel non-obstructive CAD w. PCWP = 28; CI = 1.83; SVR = 1400  - Obtain Lactate   - Consider Furosemide 40 mg IV QD (Goal Net Negative: 1-2L)  - Monitor strict I/O's   - Consider Entresto 24/26 mg BID vs hydralazine 25 / Isordil 10 Q8H   - C/w Metoprolol succinate 100 mg     #CAD   - Consider aspirin 81 mg, atorvastatin 40 mg      #New Onset Atrial Fibrillation   - C/w apixaban and metoprolol   - EP following      *Pending discussion with Dr. Hong  57Y M w/ PMH of PVCs (Dr. Taylor), tinnitus, and anxiety presenting with SOB and chest tightness on 8/1/22 with Afib on presentation.     #HFrEF (EF: 40%) likely 2/2 non-ischemic cardiomyopathy   LHC: triple vessel non-obstructive CAD w. PCWP = 28; CI = 1.83; SVR = 1400   - Consider Furosemide 40 mg IV BID (Goal Net Negative: 1-2L)  - Consider Entresto 24/26 mg BID   - Consider Aldactone 25 mg QD   - C/w Metoprolol succinate 100 mg   - Monitor strict I/O's, Daily weights    #CAD   - Consider aspirin 81 mg, atorvastatin 40 mg      #New Onset Atrial Fibrillation   - C/w apixaban and metoprolol   - EP following, appreciate recs      *Pending discussion with Dr. Hong  57Y M w/ PMH of PVCs (Dr. Taylor), tinnitus, and anxiety presenting with SOB and chest tightness on 8/1/22 with Afib on presentation.     #HFrEF 20-25%  LHC: non-obstructive CAD w. PCWP = 28; CI = 1.83; SVR = 1400   - Consider Furosemide 40 mg IV BID (Goal Net Negative: 1-2L)  - Consider Entresto 24/26 mg BID   - Consider Aldactone 25 mg QD   - C/w Metoprolol succinate 100 mg   - Monitor strict I/O's, Daily weights    #CAD   - Consider aspirin 81 mg, atorvastatin 40 mg      #New Onset Atrial Fibrillation   - C/w apixaban and metoprolol   - EP following, appreciate recs

## 2022-08-03 NOTE — PROGRESS NOTE ADULT - ASSESSMENT
Patient is a 57M with hx of PVCs, tinnitus and anxiety, who presents for shortness of breath x 5 days.  He is now s/p LHC that showed non obstructive CAD.     Cardio: New onset Atrial fibrillation, cardiomyopathy, hx of PVCs  - s/p LHC (8/2): Three vessel non-obstructive coronary artery disease. --Ectasia of the right coronary artery. Elevated right heart pressure (mRA 18mmHg with a V wave of 21mmHg). Moderate-severe post-capillary pulmonary hypertension (sPAP 54mmHg,  via RRA   - EP consult: recommendation appreciated  - Cardiology consult: recommendations appreciated   - Radial site precautions  - Discuss when to resume Eliquis.  Patient does not take Entresto @ home.  Patient wants to discuss with cardiologist before the initiation of any new meds.

## 2022-08-03 NOTE — CONSULT NOTE ADULT - ATTENDING COMMENTS
Patient of Dr. Taylor's    57 year old man with history of PVCs on metoprolol who presented with SOB and chest tightness. He had a few months of progressive LE edema prior to development and worsening of shortness of breath. On admission, he was found to have HFrEF and Afib with RVR. He is s/p R/LHC showing non-obstructive CAD. RHC #s reviewed: RA 18, PCWP 28 , CO 5.08, CI 1.83, SVR 1400, MvO2 66.4%  He then underwent SHRADDHA/DCCV which showed severe LV dysfunction (EF 20-25%) and he was successfully cardioverted back to sinus rhythm    Post SHRADDHA vitals -- NSR 80s, /90. He had JVD to earlobes, bibasilar crackles and LE edema.     His presentation is likely due to tachy-mediated cardiomyopathy in the setting of rapid atrial fibrillation +/- PVCs.     #HFrEF, likely tachy-mediated (EF 20-25%)  - appears volume overloaded as above, start Lasix 40mg IV BID, aim for net negative 1-2L/24 hours, daily Is/Os  - start Entresto 24-26 BID, hold for SBP < 100  - continue metoprolol succinate 100mg daily  - plan to start aldactone tomorrow    #non-obstructive CAD  - start aspirin 81mg and atorvastatin 80mg daily    #atrial fibrillation, s/p SHRADDHA/DCCV  - continue Eliquis bid  - continue metop as above  - would benefit from SAMIRA evaluation  - on amio load per EP, appreciate recs
This gentleman was seen and examined by me. I suspect 2-3 months of persistent AF with RVR leading to tachycardia induced myopathy. Risks for AF include likely SAMIRA, and perhaps some degree of social alcohol (cocktails a few days a week). He was not on OAC. CHADSVASc  is 2. He should undergo SHRADDHA/CV followed by a few months of 2-3 months of amiodarone. Reassessment of echo in 2-3 months will hopefully show recovery of LV function. Would stop amiodarone in 2-3 months if LV has recovered. If AF recurs despite addressing underlying risks, then catheter ablation warranted. He should be on a statin and will need an OP sleep study. He should be monitored closely for recurrent AF after amiodarone stopped. He also has outflow tract type VPD's which may be related to SAMIRA as well. No indication to ablate at this time and will probably be suppressed by amiodarone.

## 2022-08-04 ENCOUNTER — TRANSCRIPTION ENCOUNTER (OUTPATIENT)
Age: 57
End: 2022-08-04

## 2022-08-04 DIAGNOSIS — I50.9 HEART FAILURE, UNSPECIFIED: ICD-10-CM

## 2022-08-04 DIAGNOSIS — I48.91 UNSPECIFIED ATRIAL FIBRILLATION: ICD-10-CM

## 2022-08-04 LAB
ANION GAP SERPL CALC-SCNC: 9 MMOL/L — SIGNIFICANT CHANGE UP (ref 5–17)
BUN SERPL-MCNC: 15 MG/DL — SIGNIFICANT CHANGE UP (ref 7–23)
CALCIUM SERPL-MCNC: 8.9 MG/DL — SIGNIFICANT CHANGE UP (ref 8.4–10.5)
CHLORIDE SERPL-SCNC: 101 MMOL/L — SIGNIFICANT CHANGE UP (ref 96–108)
CO2 SERPL-SCNC: 29 MMOL/L — SIGNIFICANT CHANGE UP (ref 22–31)
CREAT SERPL-MCNC: 1.24 MG/DL — SIGNIFICANT CHANGE UP (ref 0.5–1.3)
EGFR: 69 ML/MIN/1.73M2 — SIGNIFICANT CHANGE UP
GLUCOSE SERPL-MCNC: 99 MG/DL — SIGNIFICANT CHANGE UP (ref 70–99)
HCT VFR BLD CALC: 48.1 % — SIGNIFICANT CHANGE UP (ref 39–50)
HGB BLD-MCNC: 16.1 G/DL — SIGNIFICANT CHANGE UP (ref 13–17)
MCHC RBC-ENTMCNC: 30.5 PG — SIGNIFICANT CHANGE UP (ref 27–34)
MCHC RBC-ENTMCNC: 33.5 GM/DL — SIGNIFICANT CHANGE UP (ref 32–36)
MCV RBC AUTO: 91.1 FL — SIGNIFICANT CHANGE UP (ref 80–100)
NRBC # BLD: 0 /100 WBCS — SIGNIFICANT CHANGE UP (ref 0–0)
PLATELET # BLD AUTO: 213 K/UL — SIGNIFICANT CHANGE UP (ref 150–400)
POTASSIUM SERPL-MCNC: 4.2 MMOL/L — SIGNIFICANT CHANGE UP (ref 3.5–5.3)
POTASSIUM SERPL-SCNC: 4.2 MMOL/L — SIGNIFICANT CHANGE UP (ref 3.5–5.3)
RBC # BLD: 5.28 M/UL — SIGNIFICANT CHANGE UP (ref 4.2–5.8)
RBC # FLD: 13.5 % — SIGNIFICANT CHANGE UP (ref 10.3–14.5)
SODIUM SERPL-SCNC: 139 MMOL/L — SIGNIFICANT CHANGE UP (ref 135–145)
WBC # BLD: 9.65 K/UL — SIGNIFICANT CHANGE UP (ref 3.8–10.5)
WBC # FLD AUTO: 9.65 K/UL — SIGNIFICANT CHANGE UP (ref 3.8–10.5)

## 2022-08-04 PROCEDURE — 93010 ELECTROCARDIOGRAM REPORT: CPT

## 2022-08-04 PROCEDURE — 99233 SBSQ HOSP IP/OBS HIGH 50: CPT

## 2022-08-04 RX ORDER — ASPIRIN/CALCIUM CARB/MAGNESIUM 324 MG
81 TABLET ORAL DAILY
Refills: 0 | Status: DISCONTINUED | OUTPATIENT
Start: 2022-08-04 | End: 2022-08-05

## 2022-08-04 RX ORDER — ATORVASTATIN CALCIUM 80 MG/1
40 TABLET, FILM COATED ORAL AT BEDTIME
Refills: 0 | Status: DISCONTINUED | OUTPATIENT
Start: 2022-08-04 | End: 2022-08-05

## 2022-08-04 RX ORDER — SPIRONOLACTONE 25 MG/1
25 TABLET, FILM COATED ORAL DAILY
Refills: 0 | Status: DISCONTINUED | OUTPATIENT
Start: 2022-08-04 | End: 2022-08-05

## 2022-08-04 RX ADMIN — APIXABAN 5 MILLIGRAM(S): 2.5 TABLET, FILM COATED ORAL at 05:12

## 2022-08-04 RX ADMIN — Medication 0.25 MILLIGRAM(S): at 05:14

## 2022-08-04 RX ADMIN — SACUBITRIL AND VALSARTAN 1 TABLET(S): 24; 26 TABLET, FILM COATED ORAL at 17:50

## 2022-08-04 RX ADMIN — APIXABAN 5 MILLIGRAM(S): 2.5 TABLET, FILM COATED ORAL at 17:52

## 2022-08-04 RX ADMIN — Medication 81 MILLIGRAM(S): at 13:04

## 2022-08-04 RX ADMIN — Medication 30 MILLILITER(S): at 13:16

## 2022-08-04 RX ADMIN — AMIODARONE HYDROCHLORIDE 400 MILLIGRAM(S): 400 TABLET ORAL at 05:11

## 2022-08-04 RX ADMIN — AMIODARONE HYDROCHLORIDE 400 MILLIGRAM(S): 400 TABLET ORAL at 13:05

## 2022-08-04 RX ADMIN — ATORVASTATIN CALCIUM 40 MILLIGRAM(S): 80 TABLET, FILM COATED ORAL at 20:54

## 2022-08-04 RX ADMIN — Medication 40 MILLIGRAM(S): at 05:10

## 2022-08-04 RX ADMIN — SPIRONOLACTONE 25 MILLIGRAM(S): 25 TABLET, FILM COATED ORAL at 13:05

## 2022-08-04 RX ADMIN — SACUBITRIL AND VALSARTAN 1 TABLET(S): 24; 26 TABLET, FILM COATED ORAL at 05:11

## 2022-08-04 RX ADMIN — AMIODARONE HYDROCHLORIDE 400 MILLIGRAM(S): 400 TABLET ORAL at 20:54

## 2022-08-04 RX ADMIN — Medication 40 MILLIGRAM(S): at 13:04

## 2022-08-04 RX ADMIN — Medication 100 MILLIGRAM(S): at 05:12

## 2022-08-04 NOTE — PROGRESS NOTE ADULT - SUBJECTIVE AND OBJECTIVE BOX
24H hour events:   Successful SHRADDHA guided cardioversion yesterday. Started on amiodarone after conversion to NSR.    Telemetry reviewed: NSR 70s - 90s overnight.    MEDICATIONS:  aMIOdarone    Tablet   Oral   aMIOdarone    Tablet 400 milliGRAM(s) Oral every 8 hours  apixaban 5 milliGRAM(s) Oral every 12 hours  aspirin enteric coated 81 milliGRAM(s) Oral daily  furosemide   Injectable 40 milliGRAM(s) IV Push two times a day  metoprolol succinate  milliGRAM(s) Oral daily  sacubitril 24 mG/valsartan 26 mG 1 Tablet(s) Oral two times a day  spironolactone 25 milliGRAM(s) Oral daily  ALPRAZolam 0.25 milliGRAM(s) Oral daily  aluminum hydroxide/magnesium hydroxide/simethicone Suspension 30 milliLiter(s) Oral every 4 hours PRN  simethicone 80 milliGRAM(s) Chew every 6 hours PRN  atorvastatin 40 milliGRAM(s) Oral at bedtime    REVIEW OF SYSTEMS:  Complete 10point ROS negative.    PHYSICAL EXAM:  T(C): 36.5 (08-04-22 @ 09:58), Max: 36.6 (08-03-22 @ 15:40)  HR: 70 (08-04-22 @ 09:58) (70 - 108)  BP: 109/69 (08-04-22 @ 09:58) (109/69 - 133/90)  RR: 18 (08-04-22 @ 09:58) (16 - 18)  SpO2: 100% (08-04-22 @ 09:58) (95% - 100%)  Wt(kg): --  I&O's Summary    03 Aug 2022 07:01  -  04 Aug 2022 07:00  --------------------------------------------------------  IN: 300 mL / OUT: 400 mL / NET: -100 mL    04 Aug 2022 07:01  -  04 Aug 2022 10:15  --------------------------------------------------------  IN: 240 mL / OUT: 0 mL / NET: 240 mL    Appearance: Normal	  Cardiovascular: Normal S1 S2, No JVD, No murmurs, No edema RRR  Respiratory: mild crackles bilaterally  Psychiatry: A & O x 3, Mood & affect appropriate  Gastrointestinal:  Soft, Non-tender, + BS	  Extremities: Trace peripheral edema  Vascular: Peripheral pulses palpable 2+ bilaterally    LABS:	 	  CBC Full  -  ( 04 Aug 2022 01:41 )  WBC Count : 9.65 K/uL  Hemoglobin : 16.1 g/dL  Hematocrit : 48.1 %  Platelet Count - Automated : 213 K/uL  Mean Cell Volume : 91.1 fl  Mean Cell Hemoglobin : 30.5 pg  Mean Cell Hemoglobin Concentration : 33.5 gm/dL    08-04    139  |  101  |  15  ----------------------------<  99  4.2   |  29  |  1.24  08-03    141  |  101  |  17  ----------------------------<  106<H>  3.8   |  28  |  1.22    Ca    8.9      04 Aug 2022 01:41  Ca    9.0      03 Aug 2022 01:13 24H hour events:   Successful SHRADDHA guided cardioversion yesterday. Started on amiodarone after conversion to NSR.  Doing well, asymptomatic, no concerns at this time.    Telemetry reviewed: NSR 70s - 90s overnight.    MEDICATIONS:  aMIOdarone    Tablet   Oral   aMIOdarone    Tablet 400 milliGRAM(s) Oral every 8 hours  apixaban 5 milliGRAM(s) Oral every 12 hours  aspirin enteric coated 81 milliGRAM(s) Oral daily  furosemide   Injectable 40 milliGRAM(s) IV Push two times a day  metoprolol succinate  milliGRAM(s) Oral daily  sacubitril 24 mG/valsartan 26 mG 1 Tablet(s) Oral two times a day  spironolactone 25 milliGRAM(s) Oral daily  ALPRAZolam 0.25 milliGRAM(s) Oral daily  aluminum hydroxide/magnesium hydroxide/simethicone Suspension 30 milliLiter(s) Oral every 4 hours PRN  simethicone 80 milliGRAM(s) Chew every 6 hours PRN  atorvastatin 40 milliGRAM(s) Oral at bedtime    REVIEW OF SYSTEMS:  Complete 10point ROS negative.    PHYSICAL EXAM:  T(C): 36.5 (08-04-22 @ 09:58), Max: 36.6 (08-03-22 @ 15:40)  HR: 70 (08-04-22 @ 09:58) (70 - 108)  BP: 109/69 (08-04-22 @ 09:58) (109/69 - 133/90)  RR: 18 (08-04-22 @ 09:58) (16 - 18)  SpO2: 100% (08-04-22 @ 09:58) (95% - 100%)  Wt(kg): --  I&O's Summary    03 Aug 2022 07:01  -  04 Aug 2022 07:00  --------------------------------------------------------  IN: 300 mL / OUT: 400 mL / NET: -100 mL    04 Aug 2022 07:01  -  04 Aug 2022 10:15  --------------------------------------------------------  IN: 240 mL / OUT: 0 mL / NET: 240 mL    Appearance: Normal	  Cardiovascular: Normal S1 S2, No JVD, No murmurs, No edema RRR  Respiratory: mild crackles bilaterally  Psychiatry: A & O x 3, Mood & affect appropriate  Gastrointestinal:  Soft, Non-tender, + BS	  Extremities: Trace peripheral edema  Vascular: Peripheral pulses palpable 2+ bilaterally    LABS:	 	  CBC Full  -  ( 04 Aug 2022 01:41 )  WBC Count : 9.65 K/uL  Hemoglobin : 16.1 g/dL  Hematocrit : 48.1 %  Platelet Count - Automated : 213 K/uL  Mean Cell Volume : 91.1 fl  Mean Cell Hemoglobin : 30.5 pg  Mean Cell Hemoglobin Concentration : 33.5 gm/dL    08-04    139  |  101  |  15  ----------------------------<  99  4.2   |  29  |  1.24  08-03    141  |  101  |  17  ----------------------------<  106<H>  3.8   |  28  |  1.22    Ca    8.9      04 Aug 2022 01:41  Ca    9.0      03 Aug 2022 01:13

## 2022-08-04 NOTE — PROGRESS NOTE ADULT - SUBJECTIVE AND OBJECTIVE BOX
HPI:  58 yo M with no significant PMhx PRESENTED TO [facility] on 8/1  see H&P below from GC :      57M with hx of PVCs, tinnitus and anxiety, who presents for shortness of breath x 5 days, worse with exertion, gradual onset. However, today, was associated with new onset chest pain, described as "tight", 8/10, substernal, non-radiating, + diaphoretic, but WITHOUT headache, N/V, arm pain, jaw pain. Patient states he did heavy weightlifting 3 days ago. Also, travelled to [facility] 3 weeks ago (was on a plane ride). Does have chronic leg swelling x 4 months - had "extensive" testing by vascular surgery (Dr. Tidwell) - all testing negative, including arterial dopplers, vein mapping and ultrasound. No calf pain.   (end of copied text)  He was found to be in Afib and was given cardizem and started on metoprolol. He was started on eliquis . He was given lasix which relieved his chest tightness AND ENTRESTO was also started at [facility].     Patient had an ECHO on 8/1  that revealed :     1.   Technically difficult study with poor endocardial visualization.   2. Assessment of left ventricle regional wall motion and left ventricle   ejection fraction is limited due to poor endocardial visualization.   Consider use of IV echo contrast to better evaluate endocardium, if   clinically indicated. The left ventricle systolic function appears at   least moderately reduced, estimated LVEF    40%.   3. Normal left ventricular internal cavity size.   4. Increased LV wall thickness.   5. Possible moderator band visualized in the right ventricle.   6. Mild mitral annular calcification.   7. There appears to be some degree of mitral regurgitation, further   assessment limited by poor visualization of mitral valve leaflets.   8. The aortic valve leaflets are not well visualized, appears to have   calcification. Suboptimal Doppler assessments of aortic valve velocities   limits further evaluation.   9. The pericardium was not well visualized.      8/1 CTPA - negative for PE .     No fever or chills, no N/V/D or abd pain . He was transferred here for cardiac catheterization .  (02 Aug 2022 11:45)        Allergies    No Known Allergies    Intolerances        Medications:  ALPRAZolam 0.25 milliGRAM(s) Oral daily  aluminum hydroxide/magnesium hydroxide/simethicone Suspension 30 milliLiter(s) Oral every 4 hours PRN  aMIOdarone    Tablet   Oral   aMIOdarone    Tablet 400 milliGRAM(s) Oral every 8 hours  apixaban 5 milliGRAM(s) Oral every 12 hours  furosemide   Injectable 40 milliGRAM(s) IV Push two times a day  metoprolol succinate  milliGRAM(s) Oral daily  sacubitril 24 mG/valsartan 26 mG 1 Tablet(s) Oral two times a day  simethicone 80 milliGRAM(s) Chew every 6 hours PRN      Vitals:  T(C): 36.6 (08-04-22 @ 04:47), Max: 36.6 (08-03-22 @ 15:40)  HR: 70 (08-04-22 @ 04:47) (70 - 109)  BP: 117/73 (08-04-22 @ 04:47) (117/73 - 133/90)  BP(mean): 86 (08-04-22 @ 04:47) (86 - 110)  RR: 17 (08-04-22 @ 04:47) (16 - 18)  SpO2: 100% (08-04-22 @ 04:47) (95% - 100%)  Wt(kg): --  Daily Height in cm: 195.58 (03 Aug 2022 13:23)    Daily   I&O's Summary    03 Aug 2022 07:01  -  04 Aug 2022 06:22  --------------------------------------------------------  IN: 300 mL / OUT: 400 mL / NET: -100 mL          Physical Exam:  Appearance: Normal  Eyes: PERRL, EOMI  HENT: Normal oral mus[place]a, NC/AT  Cardiovascular: S1S2, RRR, No M/R/G, no JVD, No Lower extremity edema  Procedural Access Site: No hematoma, Non-tender to palpation, 2+ pulse, No bruit, No Ecchymosis  Respiratory: Clear to auscultation bilaterally  Gastrointestinal: Soft, Non tender, Normal Bowel Sounds  Musculoskeletal: No clubbing, No joint deformity   Neurologic: Non-focal  Lymphatic: No lymphadenopathy  Psychiatry: AAOx3, Mood & affect appropriate  Skin: No rashes, No ecchymoses, No cyanosis    08-04    139  |  101  |  15  ----------------------------<  99  4.2   |  29  |  1.24    Ca    8.9      04 Aug 2022 01:41  Phos  3.0     08-02  Mg     2.3     08-02            Serum Pro-Brain Natriuretic Peptide: 1558 pg/mL (08-01 @ 08:30)    Lipid panel   Hgb A1c                         16.1   9.65  )-----------( 213      ( 04 Aug 2022 01:41 )             48.1

## 2022-08-04 NOTE — PROGRESS NOTE ADULT - ASSESSMENT
57M with PMHx of PVCs (Dr. Taylor), tinnitus and anxiety related to tinnitus presenting with acute shortness of breath and chest tightness on Monday night 8/1. Afib on presentation, being treated for ADHF.    Final recommendations pending discussion with Dr. Denson    #New Onset Atrial Fibrillation   #Acute Decompensated Heart Failure  #h/o PVCs  Follows Dr. Taylor for PVCs on Toprol XL 50 qD. Patient with 3 months of new peripheral edema. Acute dyspnea and chest tightness on Aug 1. Started on rate control for new onset atrial fibrillation, diuresed and started on entresto with resolution of symptoms. ACS ruled out. LHC with non-obstructive triple vessel disease. Echo limited by body habitus. CTPA negative. TSH wnl. Rate controlled on Toprol xL 100 qD with spot dose lopressor 25. Unclear if atrial fibrillation preceded ADHF or vice versa.  - s/p SHRADDHA guided Cardioversion (8/3) now in NSR  - Amiodarone 400 mg q8  - Goal: 5 gram load, stop after 12 doses  - Once loaded, then 200 qD maintenance  - While on amiodarone will need outpatient ophthalmology/TFT/LFT/PFT follow up  - Recommend outpatient evaluation for SAMIRA    Jose Sanz MD  Internal Medicine, PGY-1     57M with PMHx of PVCs (Dr. Taylor), tinnitus and anxiety related to tinnitus presenting with acute shortness of breath and chest tightness on Monday night 8/1. Afib on presentation, being treated for ADHF.    #New Onset Atrial Fibrillation   #Acute Decompensated Heart Failure  #h/o PVCs  Follows Dr. Taylor for PVCs on Toprol XL 50 qD. Patient with 3 months of new peripheral edema. Acute dyspnea and chest tightness on Aug 1. Started on rate control for new onset atrial fibrillation, diuresed and started on entresto with resolution of symptoms. ACS ruled out. [facility] with non-obstructive triple vessel disease. Echo limited by body habitus. CTPA negative. TSH wnl. Rate controlled on Toprol xL 100 qD with spot dose lopressor 25. Unclear if atrial fibrillation preceded ADHF or vice versa.  - s/p SHRADDHA guided Cardioversion (8/3) now in NSR 70s - 90s  - Amiodarone 400 mg q8  - Goal: 10 gram load  - Once loaded, then 200 qD maintenance  - While on amiodarone will need outpatient ophthalmology/TFT/LFT/PFT follow up  - Recommend outpatient evaluation for SAMIRA  - Follow-up with Dr. Denson outpatient in 6 - 8 weeks.    Jose Sanz MD  Internal Medicine, PGY-1    Discussed with Dr. Denson   57M with PMHx of PVCs (Dr. Taylor), tinnitus and anxiety related to tinnitus presenting with acute shortness of breath and chest tightness on Monday night 8/1. Afib on presentation, being treated for ADHF.    #New Onset Atrial Fibrillation   #Acute Decompensated Heart Failure  #h/o PVCs  Follows Dr. Taylor for PVCs on Toprol XL 50 qD. Patient with 3 months of new peripheral edema. Acute dyspnea and chest tightness on Aug 1. Started on rate control for new onset atrial fibrillation, diuresed and started on entresto with resolution of symptoms. ACS ruled out. [facility] with non-obstructive triple vessel disease. Echo limited by body habitus. CTPA negative. TSH wnl. Rate controlled on Toprol xL 100 qD with spot dose lopressor 25. Unclear if atrial fibrillation preceded ADHF or vice versa. Risk factors for AF for patient include likely SAMIRA and alcohol use.  - s/p SHRADDHA guided Cardioversion (8/3) now in NSR 70s - 90s  - Amiodarone 400 mg q8  - Goal: 10 gram load  - Once loaded, then 200 qD maintenance  - While on amiodarone will need outpatient ophthalmology/TFT/LFT/PFT follow up  - Recommend outpatient evaluation for SAMIRA  - Follow-up with Dr. Denson outpatient in 6 - 8 weeks.  - Will need reassessment of echo in 2 - 3 months  - Likely stop amiodarone if LV recovers  - If AF recurs despite addressing underlying risk may consider ablation at that time.    Jose Sanz MD  Internal Medicine, PGY-1    Discussed with Dr. Denson

## 2022-08-04 NOTE — PROGRESS NOTE ADULT - ASSESSMENT
57Y M w/ PMH of PVCs (Dr. Taylor), tinnitus, and anxiety presenting with SOB and chest tightness on 8/1/22 with Afib on presentation.     #HFrEF 20-25%  LHC: non-obstructive CAD w. PCWP = 28; CI = 1.83; SVR = 1400   - C/w Furosemide 40 mg IV BID (Goal Net Negative: 1-2L)  - C/w Entresto 24/26 mg BID   - Start Aldactone 25 mg QD   - C/w Metoprolol succinate 100 mg   - Monitor strict I/O's, Daily weights    #CAD   - Start aspirin 81 mg, atorvastatin 40 mg      #New Onset Atrial Fibrillation   - C/w apixaban and metoprolol   - C/w amiodarone   - EP following, appreciate recs    57Y M w/ PMH of PVCs (Dr. Taylor), tinnitus, and anxiety presenting with SOB and chest tightness on 8/1/22 with Afib on presentation.     #HFrEF 20-25%  LHC: non-obstructive CAD  - C/w Furosemide 40 mg IV BID (Goal Net Negative: 1-2L)  - C/w Entresto 24/26 mg BID   - Start Aldactone 25 mg QD   - C/w Metoprolol succinate 100 mg   - Monitor strict I/O's, Daily weights    #CAD   - Start aspirin 81 mg, atorvastatin 40 mg      #New Onset Atrial Fibrillation   - C/w apixaban and metoprolol   - C/w amiodarone   - EP following, appreciate recs    57Y M w/ PMH of PVCs (Dr. Taylor), tinnitus, and anxiety presenting with SOB and chest tightness on 8/1/22 with Afib on presentation.     #HFrEF (EF: 20-25%) likely tachy- mediated   LHC: non-obstructive CAD  - C/w Furosemide 40 mg IV BID (Goal Net Negative: 1-2L)  - C/w Entresto 24/26 mg BID   - Start Aldactone 25 mg QD   - C/w Metoprolol succinate 100 mg   - Monitor strict I/O's, Daily weights    #CAD; non-obstructive  - Start aspirin 81 mg, atorvastatin 40 mg      #New Onset Atrial Fibrillation   - C/w apixaban and metoprolol   - C/w amiodarone   - EP following, appreciate recs

## 2022-08-04 NOTE — PROGRESS NOTE ADULT - SUBJECTIVE AND OBJECTIVE BOX
Patient is a 57y old  Male who presents with a chief complaint of     SUBJECTIVE / OVERNIGHT EVENTS:    MEDICATIONS  (STANDING):  ALPRAZolam 0.25 milliGRAM(s) Oral daily  aMIOdarone    Tablet   Oral   aMIOdarone    Tablet 400 milliGRAM(s) Oral every 8 hours  apixaban 5 milliGRAM(s) Oral every 12 hours  furosemide   Injectable 40 milliGRAM(s) IV Push two times a day  metoprolol succinate  milliGRAM(s) Oral daily  sacubitril 24 mG/valsartan 26 mG 1 Tablet(s) Oral two times a day    MEDICATIONS  (PRN):  aluminum hydroxide/magnesium hydroxide/simethicone Suspension 30 milliLiter(s) Oral every 4 hours PRN Dyspepsia  simethicone 80 milliGRAM(s) Chew every 6 hours PRN Gas      Vital Signs Last 24 Hrs  T(C): 36.6 (04 Aug 2022 04:47), Max: 36.6 (03 Aug 2022 15:40)  T(F): 97.9 (04 Aug 2022 04:47), Max: 97.9 (03 Aug 2022 15:40)  HR: 70 (04 Aug 2022 04:47) (70 - 109)  BP: 117/73 (04 Aug 2022 04:47) (117/73 - 133/90)  BP(mean): 86 (04 Aug 2022 04:47) (86 - 110)  RR: 17 (04 Aug 2022 04:47) (16 - 18)  SpO2: 100% (04 Aug 2022 04:47) (95% - 100%)    Parameters below as of 04 Aug 2022 04:47  Patient On (Oxygen Delivery Method): room air      CAPILLARY BLOOD GLUCOSE        I&O's Summary    03 Aug 2022 07:01  -  04 Aug 2022 06:24  --------------------------------------------------------  IN: 300 mL / OUT: 400 mL / NET: -100 mL        PHYSICAL EXAM:  GENERAL: NAD, well-developed  HEAD:  Atraumatic, Normocephalic  EYES: EOMI, PERRLA, conjunctiva and sclera clear  NECK: Supple, No JVD  CHEST/LUNG: Clear to auscultation bilaterally; No wheeze  HEART: Regular rate and rhythm; No murmurs, rubs, or gallops  ABDOMEN: Soft, Nontender, Nondistended; Bowel sounds present  EXTREMITIES:  2+ Peripheral Pulses, No clubbing, cyanosis, or edema  PSYCH: AAOx3  NEUROLOGY: non-focal  SKIN: No rashes or lesions    LABS:                        16.1   9.65  )-----------( 213      ( 04 Aug 2022 01:41 )             48.1     08-04    139  |  101  |  15  ----------------------------<  99  4.2   |  29  |  1.24    Ca    8.9      04 Aug 2022 01:41  Phos  3.0     08-02  Mg     2.3     08-02                RADIOLOGY & ADDITIONAL TESTS:    Imaging Personally Reviewed:    Consultant(s) Notes Reviewed:      Care Discussed with Consultants/Other Providers:   Patient is a 57y old  Male who presents with a chief complaint of     SUBJECTIVE / OVERNIGHT EVENTS: No acute complaints overnight. Pt was seen and examined at bedside this morning. Pt is in good spirits and denies CP, SOB, orthopnea, palpitations, headache, nausea, vomiting, changes in BM, or dysuria. Pt urinating frequently on Lasix and told to urinate in urinal for strict measure of I&O's, pt amenable.     MEDICATIONS  (STANDING):  ALPRAZolam 0.25 milliGRAM(s) Oral daily  aMIOdarone    Tablet   Oral   aMIOdarone    Tablet 400 milliGRAM(s) Oral every 8 hours  apixaban 5 milliGRAM(s) Oral every 12 hours  furosemide   Injectable 40 milliGRAM(s) IV Push two times a day  metoprolol succinate  milliGRAM(s) Oral daily  sacubitril 24 mG/valsartan 26 mG 1 Tablet(s) Oral two times a day    MEDICATIONS  (PRN):  aluminum hydroxide/magnesium hydroxide/simethicone Suspension 30 milliLiter(s) Oral every 4 hours PRN Dyspepsia  simethicone 80 milliGRAM(s) Chew every 6 hours PRN Gas      Vital Signs Last 24 Hrs  T(C): 36.6 (04 Aug 2022 04:47), Max: 36.6 (03 Aug 2022 15:40)  T(F): 97.9 (04 Aug 2022 04:47), Max: 97.9 (03 Aug 2022 15:40)  HR: 70 (04 Aug 2022 04:47) (70 - 109)  BP: 117/73 (04 Aug 2022 04:47) (117/73 - 133/90)  BP(mean): 86 (04 Aug 2022 04:47) (86 - 110)  RR: 17 (04 Aug 2022 04:47) (16 - 18)  SpO2: 100% (04 Aug 2022 04:47) (95% - 100%)    Parameters below as of 04 Aug 2022 04:47  Patient On (Oxygen Delivery Method): room air      CAPILLARY BLOOD GLUCOSE        I&O's Summary    03 Aug 2022 07:01  -  04 Aug 2022 06:24  --------------------------------------------------------  IN: 300 mL / OUT: 400 mL / NET: -100 mL        PHYSICAL EXAM:  GENERAL: NAD, well-developed  HEAD:  Atraumatic, Normocephalic  EYES: EOMI, PERRLA, conjunctiva and sclera clear  NECK: Supple, No JVD  CHEST/LUNG: mild crackles at lung bases b/l  HEART: Regular rate and rhythm; No murmurs, rubs, or gallops  ABDOMEN: Soft, Nontender, Nondistended; Bowel sounds present  EXTREMITIES:  2+ piting edema LE b/l   PSYCH: AAOx3  NEUROLOGY: non-focal  SKIN: No rashes or lesions    LABS:                        16.1   9.65  )-----------( 213      ( 04 Aug 2022 01:41 )             48.1     08-04    139  |  101  |  15  ----------------------------<  99  4.2   |  29  |  1.24    Ca    8.9      04 Aug 2022 01:41  Phos  3.0     08-02  Mg     2.3     08-02                RADIOLOGY & ADDITIONAL TESTS:    Imaging Personally Reviewed:  < from: SHRADDHA w/TTE (w/Cont) (08.03.22 @ 14:05) >  Conclusions:  1. Mildly dilated left atrium.  LA volume index = 36 cc/m2.    Spontaneous echo contrast seen.   No left atrial or left  atrial appendage thrombus.   Decreased left atrial  appendage velocities noted.  2. Mild left ventricular enlargement.  3. Severe global left ventricular systolic dysfunction.  Endocardial visualization enhanced with intravenous  injection of Ultrasonic Enhancing Agent (Lumason).  4. Right ventricular enlargement with decreased right  ventricular systolic function.  5. Estimated pulmonary artery systolic pressure equals 46  mm Hg, assuming right atrial pressure equals 8 mm Hg,  consistent with mild pulmonary pressures.  6. Agitated saline injection and color flow Doppler  demonstrates no evidence of a patent foramen ovale.  *** No previous Echo exam.

## 2022-08-05 ENCOUNTER — TRANSCRIPTION ENCOUNTER (OUTPATIENT)
Age: 57
End: 2022-08-05

## 2022-08-05 VITALS
DIASTOLIC BLOOD PRESSURE: 83 MMHG | TEMPERATURE: 98 F | SYSTOLIC BLOOD PRESSURE: 118 MMHG | OXYGEN SATURATION: 97 % | RESPIRATION RATE: 16 BRPM | HEART RATE: 68 BPM

## 2022-08-05 PROCEDURE — 92960 CARDIOVERSION ELECTRIC EXT: CPT

## 2022-08-05 PROCEDURE — 93460 R&L HRT ART/VENTRICLE ANGIO: CPT

## 2022-08-05 PROCEDURE — C1887: CPT

## 2022-08-05 PROCEDURE — 80048 BASIC METABOLIC PNL TOTAL CA: CPT

## 2022-08-05 PROCEDURE — 93312 ECHO TRANSESOPHAGEAL: CPT

## 2022-08-05 PROCEDURE — C1769: CPT

## 2022-08-05 PROCEDURE — 93306 TTE W/DOPPLER COMPLETE: CPT

## 2022-08-05 PROCEDURE — 36415 COLL VENOUS BLD VENIPUNCTURE: CPT

## 2022-08-05 PROCEDURE — 85027 COMPLETE CBC AUTOMATED: CPT

## 2022-08-05 PROCEDURE — 93005 ELECTROCARDIOGRAM TRACING: CPT

## 2022-08-05 PROCEDURE — C1894: CPT

## 2022-08-05 PROCEDURE — 99233 SBSQ HOSP IP/OBS HIGH 50: CPT

## 2022-08-05 RX ORDER — SACUBITRIL AND VALSARTAN 24; 26 MG/1; MG/1
1 TABLET, FILM COATED ORAL
Qty: 180 | Refills: 1
Start: 2022-08-05 | End: 2023-01-31

## 2022-08-05 RX ORDER — ASPIRIN/CALCIUM CARB/MAGNESIUM 324 MG
1 TABLET ORAL
Qty: 0 | Refills: 0 | DISCHARGE
Start: 2022-08-05

## 2022-08-05 RX ORDER — FUROSEMIDE 40 MG
40 TABLET ORAL DAILY
Refills: 0 | Status: DISCONTINUED | OUTPATIENT
Start: 2022-08-05 | End: 2022-08-05

## 2022-08-05 RX ORDER — ASPIRIN/CALCIUM CARB/MAGNESIUM 324 MG
1 TABLET ORAL
Qty: 90 | Refills: 3
Start: 2022-08-05 | End: 2023-07-30

## 2022-08-05 RX ORDER — METOPROLOL TARTRATE 50 MG
1 TABLET ORAL
Qty: 90 | Refills: 3
Start: 2022-08-05 | End: 2023-07-30

## 2022-08-05 RX ORDER — SPIRONOLACTONE 25 MG/1
1 TABLET, FILM COATED ORAL
Qty: 30 | Refills: 0
Start: 2022-08-05 | End: 2022-09-03

## 2022-08-05 RX ORDER — AMIODARONE HYDROCHLORIDE 400 MG/1
2 TABLET ORAL
Qty: 7 | Refills: 0
Start: 2022-08-05 | End: 2022-08-06

## 2022-08-05 RX ORDER — AMIODARONE HYDROCHLORIDE 400 MG/1
2 TABLET ORAL
Qty: 16 | Refills: 0
Start: 2022-08-05 | End: 2022-08-08

## 2022-08-05 RX ORDER — APIXABAN 2.5 MG/1
1 TABLET, FILM COATED ORAL
Qty: 180 | Refills: 1
Start: 2022-08-05 | End: 2023-01-31

## 2022-08-05 RX ORDER — AMIODARONE HYDROCHLORIDE 400 MG/1
2 TABLET ORAL
Qty: 3 | Refills: 0
Start: 2022-08-05

## 2022-08-05 RX ORDER — FUROSEMIDE 40 MG
1 TABLET ORAL
Qty: 90 | Refills: 3
Start: 2022-08-05 | End: 2023-07-30

## 2022-08-05 RX ORDER — ATORVASTATIN CALCIUM 80 MG/1
1 TABLET, FILM COATED ORAL
Qty: 30 | Refills: 0
Start: 2022-08-05 | End: 2022-09-03

## 2022-08-05 RX ADMIN — APIXABAN 5 MILLIGRAM(S): 2.5 TABLET, FILM COATED ORAL at 05:28

## 2022-08-05 RX ADMIN — Medication 40 MILLIGRAM(S): at 05:35

## 2022-08-05 RX ADMIN — AMIODARONE HYDROCHLORIDE 400 MILLIGRAM(S): 400 TABLET ORAL at 05:28

## 2022-08-05 RX ADMIN — SPIRONOLACTONE 25 MILLIGRAM(S): 25 TABLET, FILM COATED ORAL at 05:28

## 2022-08-05 RX ADMIN — Medication 100 MILLIGRAM(S): at 05:28

## 2022-08-05 RX ADMIN — SACUBITRIL AND VALSARTAN 1 TABLET(S): 24; 26 TABLET, FILM COATED ORAL at 05:28

## 2022-08-05 NOTE — DISCHARGE NOTE PROVIDER - NSDCFUADDINST_GEN_ALL_CORE_FT
WOUND CARE:  The day AFTER your procedure  - Remove the bandage at the site GENTLY, clean with mild soap and water, and pat dry; leave open to air  - You may shower   - DO NOT apply lotions, creams, ointments, powder, perfumes to your incision site  -Check your groin every day. A small amount of bruising or soreness is normal, a bump ( smaller than nickel) might be present, normal  - DO NOT SOAK your site for 1 week ( no baths, no pools, no tubs, etc..)    ACTIVITY:  Your procedure was done through your groin  for the next 5 DAYS:  - Limit climbing stairs, no strenuous activity, pushing , pulling, or straining   DO NOT LIFT anything 10 lbs or heavier   you may resume sexual activity in 7 days, unless instructed otherwise  mild palpitations are normal     Follow heart healthy diet recommended by your doctor, , if you smoke STOP SMOKING ( may call [phone] for center of tobacco control if you need assistance).  for the next 24 hours:   - stay at home and rest, do not drive or operate heavy machinery   do not drink alcoholic beverages   do not make important personal or business decisions     ***CALL YOUR DOCTOR ***  IF you have fever, chills, body aches, or severe pain, swelling, redness, heat, yellow drainage from your incision site  IF bleeding  or significant new swelling from your puncture site  IF you experience rapid heartbeat or palpitations that cause: lightheadedness, dizziness, or fainting spell.  If you experience difficulty swallowing, or pain with swallowing   IF unable to reach your doctor, you may call the Cardiology Office at [facility] at [phone]

## 2022-08-05 NOTE — DISCHARGE NOTE PROVIDER - NSDCFUSCHEDAPPT_GEN_ALL_CORE_FT
Greg Denson  [facility]  [address]  Scheduled Appointment: 09/16/2022

## 2022-08-05 NOTE — DISCHARGE NOTE PROVIDER - NSDCPNSUBOBJ_GEN_ALL_CORE
Patient is a 57y old  Male who presents with a chief complaint of cp (04 Aug 2022 06:24)          Allergies    No Known Allergies    Intolerances        Medications:  ALPRAZolam 0.25 milliGRAM(s) Oral daily  aluminum hydroxide/magnesium hydroxide/simethicone Suspension 30 milliLiter(s) Oral every 4 hours PRN  aMIOdarone    Tablet   Oral   aMIOdarone    Tablet 400 milliGRAM(s) Oral every 8 hours  apixaban 5 milliGRAM(s) Oral every 12 hours  aspirin enteric coated 81 milliGRAM(s) Oral daily  atorvastatin 40 milliGRAM(s) Oral at bedtime  furosemide   Injectable 40 milliGRAM(s) IV Push two times a day  metoprolol succinate  milliGRAM(s) Oral daily  sacubitril 24 mG/valsartan 26 mG 1 Tablet(s) Oral two times a day  simethicone 80 milliGRAM(s) Chew every 6 hours PRN  spironolactone 25 milliGRAM(s) Oral daily      Vitals:  T(C): 36.7 (22 @ 19:23), Max: 36.7 (22 @ 19:23)  HR: 70 (22 @ 19:23) (68 - 75)  BP: 100/68 (22 @ 19:23) (100/68 - 117/73)  BP(mean): 79 (22 @ 19:23) (79 - 86)  RR: 18 (22 @ 19:23) (17 - 18)  SpO2: 100% (22 @ 19:23) (96% - 100%)  Wt(kg): --  Daily     Daily Weight in k (04 Aug 2022 08:47)  I&O's Summary    03 Aug 2022 07:01  -  04 Aug 2022 07:00  --------------------------------------------------------  IN: 800 mL / OUT: 400 mL / NET: 400 mL    04 Aug 2022 07:01  -  05 Aug 2022 02:44  --------------------------------------------------------  IN: 980 mL / OUT: 2200 mL / NET: -1220 mL          Physical Exam:  Appearance: Normal  Eyes: PERRL, EOMI  HENT: Normal oral mus[place]a, NC/AT  Cardiovascular: S1S2, RRR, No M/R/G, no JVD, No Lower extremity edema  Procedural Access Site: No hematoma, Non-tender to palpation, 2+ pulse, No bruit, No Ecchymosis  Respiratory: Clear to auscultation bilaterally  Gastrointestinal: Soft, Non tender, Normal Bowel Sounds  Musculoskeletal: No clubbing, No joint deformity   Neurologic: Non-focal  Lymphatic: No lymphadenopathy  Psychiatry: AAOx3, Mood & affect appropriate  Skin: No rashes, No ecchymoses, No cyanosis        139  |  101  |  15  ----------------------------<  99  4.2   |  29  |  1.24    Ca    8.9      04 Aug 2022 01:41            Serum Pro-Brain Natriuretic Peptide: 1558 pg/mL ( @ 08:30)    Lipid panel   Hgb A1c                         16.1   9.65  )-----------( 213      ( 04 Aug 2022 01:41 )             48.1         ECG: SR 74 bpm    Atrial fibrillation: Serial EKG  Telemetry monitoring  Continue Eliquis    Cath: Monitor radial site for swelling, bleeding  Continue ASA, Eliquis     Imaging:    Interpretation of Telemetry:

## 2022-08-05 NOTE — PROGRESS NOTE ADULT - TIME BILLING
Extensive counseling done on atrial fibrillation, heart failure, and importance of monitoring weights and leg swelling at home.
discussion of medications, diagnosis, and lifestyle changes in setting of atrial fibrillation and HFrEF

## 2022-08-05 NOTE — PROGRESS NOTE ADULT - SUBJECTIVE AND OBJECTIVE BOX
Patient is a 57y old  Male who presents with a chief complaint of new onset Afib (05 Aug 2022 02:32)      SUBJECTIVE / OVERNIGHT EVENTS: No acute events overnight. Pt was seen and bedside this morning. Pt appears well and is in good spirits. Denies CP, palpitations, headache, nausea, vomiting, SOB, changes in BM or dysuria.     MEDICATIONS  (STANDING):  ALPRAZolam 0.25 milliGRAM(s) Oral daily  aMIOdarone    Tablet   Oral   aMIOdarone    Tablet 400 milliGRAM(s) Oral every 8 hours  apixaban 5 milliGRAM(s) Oral every 12 hours  aspirin enteric coated 81 milliGRAM(s) Oral daily  atorvastatin 40 milliGRAM(s) Oral at bedtime  furosemide    Tablet 40 milliGRAM(s) Oral daily  metoprolol succinate  milliGRAM(s) Oral daily  sacubitril 24 mG/valsartan 26 mG 1 Tablet(s) Oral two times a day  spironolactone 25 milliGRAM(s) Oral daily    MEDICATIONS  (PRN):  aluminum hydroxide/magnesium hydroxide/simethicone Suspension 30 milliLiter(s) Oral every 4 hours PRN Dyspepsia  simethicone 80 milliGRAM(s) Chew every 6 hours PRN Gas      Vital Signs Last 24 Hrs  T(C): 36.7 (05 Aug 2022 09:22), Max: 36.7 (04 Aug 2022 19:23)  T(F): 98.1 (05 Aug 2022 09:22), Max: 98.1 (05 Aug 2022 09:22)  HR: 87 (05 Aug 2022 09:22) (65 - 87)  BP: 107/79 (05 Aug 2022 09:22) (100/68 - 114/84)  BP(mean): 94 (05 Aug 2022 05:06) (79 - 94)  RR: 18 (05 Aug 2022 09:22) (18 - 18)  SpO2: 96% (05 Aug 2022 09:22) (96% - 100%)    Parameters below as of 05 Aug 2022 09:22  Patient On (Oxygen Delivery Method): room air      CAPILLARY BLOOD GLUCOSE        I&O's Summary    04 Aug 2022 07:01  -  05 Aug 2022 07:00  --------------------------------------------------------  IN: 980 mL / OUT: 2200 mL / NET: -1220 mL        PHYSICAL EXAM:  GENERAL: NAD, well-developed  HEAD:  Atraumatic, Normocephalic  EYES: EOMI, PERRLA, conjunctiva and sclera clear  NECK: Supple, No JVD  CHEST/LUNG: Clear to auscultation bilaterally; No wheeze  HEART: Iregular rate and rhythm; No murmurs, rubs, or gallops  ABDOMEN: Soft, Nontender, Nondistended; Bowel sounds present  EXTREMITIES:  2+ piting edema LE b/l   PSYCH: AAOx3  NEUROLOGY: non-focal  SKIN: No rashes or lesions    LABS:                        16.1   9.65  )-----------( 213      ( 04 Aug 2022 01:41 )             48.1     08-04    139  |  101  |  15  ----------------------------<  99  4.2   |  29  |  1.24    Ca    8.9      04 Aug 2022 01:41

## 2022-08-05 NOTE — DISCHARGE NOTE PROVIDER - NSDCFUADDAPPT_GEN_ALL_CORE_FT
- While on amiodarone you will need out patient ophthalmology/TFT/LFT/PFT follow up  - Recommend outpatient evaluation for obstructive sleep apnea.  Please discuss with your doctor to have this set up.   - Follow-up with Dr. Denson outpatient on 9/16/22 at 1:00 PM   - You will need another echo in 2 - 3 months

## 2022-08-05 NOTE — PROGRESS NOTE ADULT - ASSESSMENT
57M with PMHx of PVCs (Dr. Taylor), tinnitus and anxiety related to tinnitus presenting with acute shortness of breath and chest tightness Monday night 8/1. With new onset AF on presentation. Follows Dr. Taylor for PVC's on Toprol XL 50 QD. Patient with 3 months of new peripheral edema. Started on rate control for new onset atrial fibrillation while admitted, diuresed and started on Entresto with resolution of symptoms. ACS ruled out. [facility] with non-obstructive triple vessel disease. CTPA negative. TSH wnl. S/p SHRADDHA DCCV  (8/3) now in NSR 70 - 90's. Started on Amiodarone post SHRADDHA DCCV.    1) New onset AF with RVR  2) HFrEF     Continue Amiodarone (400mg q8 x12 doses to 10 grams followed by 200mg qd thereafter), while on Amiodarone will need outpatient ophthalmology/TFT/LFT/PFT follow up  Recommend outpatient evaluation for SAMIRA  Follow-up with Dr. Denson as outpatient 9/16/22 1PM   Will need reassessment of echo in 2 - 3 months    EP to sign off, no further intervention

## 2022-08-05 NOTE — DISCHARGE NOTE PROVIDER - NSDCCPCAREPLAN_GEN_ALL_CORE_FT
PRINCIPAL DISCHARGE DIAGNOSIS  Diagnosis: Atrial fibrillation  Assessment and Plan of Treatment: Continue with your cardiologist and primary care MD. Continue your current medications. Call your physician for palpitations, feelings of rapid heart beat, lightheadedness, or dizziness. Continue Eliquis as prescribed.       PRINCIPAL DISCHARGE DIAGNOSIS  Diagnosis: Atrial fibrillation  Assessment and Plan of Treatment: Continue with your cardiologist and primary care MD. Continue your current medications and the new medications you have been started on as prescribed.  Please do not miss any doses. Call your physician for palpitations, feelings of rapid heart beat, lightheadedness, or dizziness. Continue Eliquis as prescribed.  You have been started on Amiodarone.  Upon discharge you will take on 8/5 take 2 tabs (400 mg) @ 8 PM, then on 8/6 take 2 tabs (400 mg) three times a day then stop.  Begin your maintenance dose of 200 mg on 8/7.  You will continue on the Amiodarone 200 mg daily.      SECONDARY DISCHARGE DIAGNOSES  Diagnosis: CHF with cardiomyopathy  Assessment and Plan of Treatment: Take your medications as prescribed. Follow a  low-salt,  low cholesterol heart healthy diet. Weigh yourself every day; call your doctor if you gain 2 pounds over one to two days or 3 pounds over three days. Get to or maintain a healthy weight; ask your heart failure team for referrals to a registered dietitian if needed. Be active (check with your physician or cardiologist first). Find healthy ways to deal with stress, such as deep breathing, meditation, exercise, and doing hobbies that you enjoy. If you smoke, quit. (A resource to help you stop smoking is the [facility] – phone number [phone].).       PRINCIPAL DISCHARGE DIAGNOSIS  Diagnosis: Atrial fibrillation  Assessment and Plan of Treatment: Continue with your cardiologist and primary care MD. Continue your current medications and the new medications you have been started on as prescribed.  Please do not miss any doses. Call your physician for palpitations, feelings of rapid heart beat, lightheadedness, or dizziness. Continue Eliquis as prescribed.  You have been started on Amiodarone.  Upon discharge you will take on 8/5 take 2 tabs (400 mg) @ 8 PM, then on 8/6 take 2 tabs (400 mg) three times a day then stop.  Begin your maintenance dose of 200 mg on 8/7.  You will continue on the Amiodarone 200 mg daily.      SECONDARY DISCHARGE DIAGNOSES  Diagnosis: CHF with cardiomyopathy  Assessment and Plan of Treatment: Take your medications as prescribed. Follow a  low-salt,  low cholesterol heart healthy diet. Weigh yourself every day; call your doctor if you gain 2 pounds over one to two days or 3 pounds over three days. Get to or maintain a healthy weight; ask your heart failure team for referrals to a registered dietitian if needed. Be active (check with your physician or cardiologist first). Find healthy ways to deal with stress, such as deep breathing, meditation, exercise, and doing hobbies that you enjoy. If you smoke, quit. (A resource to help you stop smoking is the [facility] – phone number [phone].).  Prior authorization completed on 8/5 and approved for 1 year.  Case #: [id number]

## 2022-08-05 NOTE — PROGRESS NOTE ADULT - SUBJECTIVE AND OBJECTIVE BOX
24H hour events: SR 70's     MEDICATIONS:  aMIOdarone    Tablet   Oral   aMIOdarone    Tablet 400 milliGRAM(s) Oral every 8 hours  apixaban 5 milliGRAM(s) Oral every 12 hours  aspirin enteric coated 81 milliGRAM(s) Oral daily  furosemide    Tablet 40 milliGRAM(s) Oral daily  metoprolol succinate  milliGRAM(s) Oral daily  sacubitril 24 mG/valsartan 26 mG 1 Tablet(s) Oral two times a day  spironolactone 25 milliGRAM(s) Oral daily        ALPRAZolam 0.25 milliGRAM(s) Oral daily    aluminum hydroxide/magnesium hydroxide/simethicone Suspension 30 milliLiter(s) Oral every 4 hours PRN  simethicone 80 milliGRAM(s) Chew every 6 hours PRN    atorvastatin 40 milliGRAM(s) Oral at bedtime        REVIEW OF SYSTEMS:  See HPI, otherwise ROS negative.    PHYSICAL EXAM:  T(C): 36.7 (08-05-22 @ 09:00), Max: 36.7 (08-04-22 @ 19:23)  HR: 87 (08-05-22 @ 09:00) (65 - 87)  BP: 107/79 (08-05-22 @ 09:00) (100/68 - 114/84)  RR: 18 (08-05-22 @ 09:00) (18 - 18)  SpO2: 96% (08-05-22 @ 09:00) (96% - 100%)  Wt(kg): --  I&O's Summary    04 Aug 2022 07:01  -  05 Aug 2022 07:00  --------------------------------------------------------  IN: 980 mL / OUT: 2200 mL / NET: -1220 mL        Appearance: Alert. NAD	  Cardiovascular: +S1S2 RRR no m/g/r  Respiratory: CTA B/L	  Gastrointestinal:  Soft, NT. ND. +BS	  Neurologic: Non-focal  Extremities: No edema BLE  Vascular: Peripheral pulses palpable 2+ bilaterally      LABS:	 	    CBC Full  -  ( 04 Aug 2022 01:41 )  WBC Count : 9.65 K/uL  Hemoglobin : 16.1 g/dL  Hematocrit : 48.1 %  Platelet Count - Automated : 213 K/uL  Mean Cell Volume : 91.1 fl  Mean Cell Hemoglobin : 30.5 pg  Mean Cell Hemoglobin Concentration : 33.5 gm/dL  Auto Neutrophil # : x  Auto Lymphocyte # : x  Auto Monocyte # : x  Auto Eosinophil # : x  Auto Basophil # : x  Auto Neutrophil % : x  Auto Lymphocyte % : x  Auto Monocyte % : x  Auto Eosinophil % : x  Auto Basophil % : x    08-04    139  |  101  |  15  ----------------------------<  99  4.2   |  29  |  1.24    Ca    8.9      04 Aug 2022 01:41        proBNP: Serum Pro-Brain Natriuretic Peptide: 1558 pg/mL (08-01 @ 08:30)    TSH: Thyroid Stimulating Hormone, Serum: 1.724 uIU/mL (08.02.22 @ 06:25)      TELE: SR 70's    	    ECG:  	< from: 12 Lead ECG (08.04.22 @ 00:53) >  Ventricular Rate 74 BPM    Atrial Rate 74 BPM    P-R Interval 176 ms    QRS Duration 94 ms    Q-T Interval 416 ms    QTC Calculation(Bazett) 461 ms    P Axis 77 degrees    R Axis 16 degrees    T Axis 67 degrees    Diagnosis Line NORMAL SINUS RHYTHM  POSSIBLE LEFT ATRIAL ENLARGEMENT  BORDERLINE ECG  WHEN COMPARED WITH ECG OF 03-AUG-2022 09:25,  NO SIGNIFICANT CHANGE WAS FOUND    < end of copied text >      SHRADDHA: < from: SHRADDHA w/TTE (w/Cont) (08.03.22 @ 14:05) >  Dimensions:    Normal Values:  LA:     4.5    2.0 - 4.0 cm  Ao:     3.0    2.0 - 3.8 cm  SEPTUM: 1.1    0.6 - 1.2 cm  PWT:    1.2    0.6 - 1.1 cm  LVIDd:  6.0    3.0 - 5.6 cm  LVIDs:  5.1    1.8 - 4.0 cm  Derived variables:  LVMI: 107 g/m2  RWT: 0.40  Fractional short: 15 %  EF (Bennett Rule): 22 %Doppler Peak Velocity (m/sec):  AoV=1.5  ------------------------------------------------------------------------  Observations:  Mitral Valve: Normal mitral valve. Minimal mitral  regurgitation.  Aortic Valve/Aorta: Normal trileaflet aortic valve. Peak  transaortic valve gradient equals 9 mm Hg. Peak left  ventricular outflow tract gradient equals 3 mm Hg, mean  gradient is equal to 2 mm Hg, LVOT velocity time integral  equals 16 cm.  Aortic Root: 3 cm.  Left Atrium: Mildly dilated left atrium.  LA volume index =  36 cc/m2.   Spontaneous echo contrast seen.   No left  atrial or left atrial appendage thrombus.   Decreased left  atrial appendage velocities noted.  Left Ventricle: Severe global left ventricular systolic  dysfunction.   Endocardial visualization enhanced with  intravenous injection of Ultrasonic Enhancing Agent  (Lumason). Mild left ventricular enlargement.  Right Heart: Mild right atrial enlargement. Right  ventricular enlargement with decreased right ventricular  systolic function. Normal tricuspid valve. Mild tricuspid  regurgitation. Normal pulmonic valve.  Pericardium/Pleura: Normal pericardium with trace  pericardial effusion.  Hemodynamic: Estimated right atrial pressure is 8 mm Hg.  Estimated right ventricular systolic pressure equals 46 mm  Hg, assuming right atrial pressure equals 8 mm Hg,  consistent with mild pulmonary hypertension. Agitated  saline injection and color flow Doppler demonstrates no  evidence of a patent foramen ovale.  ------------------------------------------------------------------------  Conclusions:  1. Mildly dilated left atrium.  LA volume index = 36 cc/m2.    Spontaneous echo contrast seen.   No left atrial or left  atrial appendage thrombus.   Decreased left atrial  appendage velocities noted.  2. Mild left ventricular enlargement.  3. Severe global left ventricular systolic dysfunction.  Endocardial visualization enhanced with intravenous  injection of Ultrasonic Enhancing Agent (Lumason).  4. Right ventricular enlargement with decreased right  ventricular systolic function.  5. Estimated pulmonary artery systolic pressure equals 46  mm Hg, assuming right atrial pressure equals 8 mm Hg,  consistent with mild pulmonary pressures.  6. Agitated saline injection and color flow Doppler  demonstrates no evidence of a patent foramen ovale.  *** No previous Echo exam.    < end of copied text >

## 2022-08-05 NOTE — PROGRESS NOTE ADULT - ATTENDING COMMENTS
Patient of Dr. Taylor's    57 year old man with history of PVCs on metoprolol who presented with SOB and chest tightness. He had a few months of progressive LE edema prior to development and worsening of shortness of breath. On admission, he was found to have HFrEF and Afib with RVR. He is s/p R/LHC showing non-obstructive CAD. RHC #s reviewed: RA 18, PCWP 28 , CO 5.08, CI 1.83, SVR 1400, MvO2 66.4%  He then underwent SHRADDHA/DCCV which showed severe LV dysfunction (EF 20-25%) and he was successfully cardioverted back to sinus rhythm. Post SHRADDHA vitals -- NSR 80s, /90. He had JVD to earlobes, bibasilar crackles and LE edema.     His presentation is likely due to tachy-mediated cardiomyopathy in the setting of rapid atrial fibrillation +/- PVCs. Today, he feels improved. Urinating well. Is/Os not well recorded but LE edema and JVD improving.     #HFrEF, likely tachy-mediated (EF 20-25%)  - continue Lasix 40mg PO daily  - continue Entresto 24-26 BID, hold for SBP < 100  - continue metoprolol succinate 100mg daily  - continue aldactone 25mg daily    #non-obstructive CAD  - continue aspirin 81mg and atorvastatin 80mg daily    #atrial fibrillation, s/p SHRADDHA/DCCV  - continue Eliquis bid  - continue metop as above  - would benefit from SAMIRA evaluation  - on amio load per EP, appreciate recs    Patient to f/u with cardiologist in 1-2 weeks
Patient of Dr. Taylor's    57 year old man with history of PVCs on metoprolol who presented with SOB and chest tightness. He had a few months of progressive LE edema prior to development and worsening of shortness of breath. On admission, he was found to have HFrEF and Afib with RVR. He is s/p R/LHC showing non-obstructive CAD. RHC #s reviewed: RA 18, PCWP 28 , CO 5.08, CI 1.83, SVR 1400, MvO2 66.4%  He then underwent SHRADDHA/DCCV which showed severe LV dysfunction (EF 20-25%) and he was successfully cardioverted back to sinus rhythm. Post SHRADDHA vitals -- NSR 80s, /90. He had JVD to earlobes, bibasilar crackles and LE edema.     His presentation is likely due to tachy-mediated cardiomyopathy in the setting of rapid atrial fibrillation +/- PVCs. Today, he feels improved. Urinating well. Is/Os not well recorded but LE edema and JVD improving.     #HFrEF, likely tachy-mediated (EF 20-25%)  - continue Lasix 40mg IV BID, aim for net negative 1-2L/24 hours, daily Is/Os, likely switch to PO diuretics tomorrow  - continue Entresto 24-26 BID, hold for SBP < 100  - continue metoprolol succinate 100mg daily  - start aldactone 25mg daily    #non-obstructive CAD  - continue aspirin 81mg and atorvastatin 80mg daily    #atrial fibrillation, s/p SHRADDHA/DCCV  - continue Eliquis bid  - continue metop as above  - would benefit from SAMIRA evaluation  - on amio load per EP, appreciate recs

## 2022-08-05 NOTE — DISCHARGE NOTE PROVIDER - CARE PROVIDER_API CALL
Alex Taylor (MD)  Cardiology; Internal Medicine  [address], [address]  [place] [address]  Phone: [phone]  Fax: [phone]  Follow Up Time: 2 weeks    Kendra Denson; PhD)  Cardiac Electrophysiology; Cardiovascular Disease; Internal Medicine  [facility] - Dept of Cardiology, [address] [place] [address]  Phone: [phone]  Fax: [phone]  Follow Up Time: 2 months   Alex Taylor (MD)  Cardiology; Internal Medicine  [address] [facility], [address]  [place] [address]  Phone: [phone]  Fax: [phone]  Follow Up Time: 2 weeks    Kendra Denson; PhD)  Cardiac Electrophysiology; Cardiovascular Disease; Internal Medicine  [facility] - Dept of Cardiology, [address] [place] [address]  Phone: [phone]  Fax: [phone]  Scheduled Appointment: 09/16/2022 01:00 AM   Alex Taylor (MD)  Cardiology; Internal Medicine  [address], [address]  [place] [address]  Phone: [phone]  Fax: [phone]  Follow Up Time: 2 weeks    Kendra Denson; PhD)  Cardiac Electrophysiology; Cardiovascular Disease; Internal Medicine  [facility] - Dept of Cardiology, [address] [place] [address]  Phone: [phone]  Fax: [phone]  Scheduled Appointment: 09/16/2022 01:00 PM

## 2022-08-05 NOTE — DISCHARGE NOTE PROVIDER - PROVIDER TOKENS
PROVIDER:[TOKEN:[[id number]],FOLLOWUP:[2 weeks]],PROVIDER:[TOKEN:[[id number]],FOLLOWUP:[2 months]] PROVIDER:[TOKEN:[[id number]],FOLLOWUP:[2 weeks]],PROVIDER:[TOKEN:[[id number]],SCHEDULEDAPPT:[09/16/2022],SCHEDULEDAPPTTIME:[01:00 AM]] PROVIDER:[TOKEN:[[id number]],FOLLOWUP:[2 weeks]],PROVIDER:[TOKEN:[[id number]],SCHEDULEDAPPT:[09/16/2022],SCHEDULEDAPPTTIME:[01:00 PM]]

## 2022-08-05 NOTE — DISCHARGE NOTE NURSING/CASE MANAGEMENT/SOCIAL WORK - PATIENT PORTAL LINK FT
You can access the FollowMyHealth Patient Portal offered by [facility] by registering at the following website: [email or web address]. By joining [employer]’s FollowMyHealth portal, you will also be able to view your health information using other applications (apps) compatible with our system.

## 2022-08-05 NOTE — DISCHARGE NOTE PROVIDER - NSDCMRMEDTOKEN_GEN_ALL_CORE_FT
aspirin 81 mg oral delayed release tablet: 1 tab(s) orally once a day  atorvastatin 40 mg oral tablet: 1 tab(s) orally once a day (at bedtime)  Eliquis 5 mg oral tablet: 1 tab(s) orally 2 times a day (HOSPITAL)   metoprolol succinate 100 mg oral tablet, extended release: 1 tab(s) orally once a day ( hospital)  sacubitril-valsartan 24 mg-26 mg oral tablet: 1 tab(s) orally 2 times a day (hospital)   testosterone: every 10-12 weeks (home)  Xanax 0.25 mg oral tablet: 1 tab(s) orally once a day (home and hospital)    amiodarone 200 mg oral tablet: 2 tab(s) orally 2 times a day   amiodarone 200 mg oral tablet: 1 tab(s) orally once a day   aspirin 81 mg oral delayed release tablet: 1 tab(s) orally once a day  atorvastatin 40 mg oral tablet: 1 tab(s) orally once a day (at bedtime)  Eliquis 5 mg oral tablet: 1 tab(s) orally 2 times a day (HOSPITAL)   metoprolol succinate 100 mg oral tablet, extended release: 1 tab(s) orally once a day ( hospital)  sacubitril-valsartan 24 mg-26 mg oral tablet: 1 tab(s) orally 2 times a day (hospital)   testosterone: every 10-12 weeks (home)  Xanax 0.25 mg oral tablet: 1 tab(s) orally once a day (home and hospital)    amiodarone 200 mg oral tablet: 2 tab(s) orally 2 times a day   amiodarone 200 mg oral tablet: 1 tab(s) orally once a day   aspirin 81 mg oral delayed release tablet: 1 tab(s) orally once a day  atorvastatin 40 mg oral tablet: 1 tab(s) orally once a day (at bedtime)  Eliquis 5 mg oral tablet: 1 tab(s) orally 2 times a day (HOSPITAL)   metoprolol succinate 100 mg oral tablet, extended release: 1 tab(s) orally once a day ( hospital)  sacubitril-valsartan 24 mg-26 mg oral tablet: 1 tab(s) orally 2 times a day (hospital)   spironolactone 25 mg oral tablet: 1 tab(s) orally once a day  testosterone: every 10-12 weeks (home)  Xanax 0.25 mg oral tablet: 1 tab(s) orally once a day (home and hospital)    amiodarone 200 mg oral tablet: 2 tab(s) orally 3 times a day    On 8/5 take 2 tabs (400 mg) @ 8 PM, then on 8/6 take 2 tabs (400 mg) three times a day then stop.  Begin your maintenance dose of 200 mg on 8/7  amiodarone 200 mg oral tablet: 1 tab(s) orally once a day   apixaban 5 mg oral tablet: 1 tab(s) orally every 12 hours  aspirin 81 mg oral delayed release tablet: 1 tab(s) orally once a day  aspirin 81 mg oral delayed release tablet: 1 tab(s) orally once a day  atorvastatin 40 mg oral tablet: 1 tab(s) orally once a day (at bedtime)  Eliquis 5 mg oral tablet: 1 tab(s) orally 2 times a day (HOSPITAL)   furosemide 40 mg oral tablet: 1 tab(s) orally once a day  metoprolol succinate 100 mg oral tablet, extended release: 1 tab(s) orally once a day ( hospital)  metoprolol succinate 100 mg oral tablet, extended release: 1 tab(s) orally once a day  sacubitril-valsartan 24 mg-26 mg oral tablet: 1 tab(s) orally 2 times a day (hospital)   sacubitril-valsartan 24 mg-26 mg oral tablet: 1 tab(s) orally 2 times a day  spironolactone 25 mg oral tablet: 1 tab(s) orally once a day  testosterone: every 10-12 weeks (home)  Xanax 0.25 mg oral tablet: 1 tab(s) orally once a day (home and hospital)

## 2022-08-05 NOTE — PROGRESS NOTE ADULT - ASSESSMENT
57Y M w/ PMH of PVCs (Dr. Tyalor), tinnitus, and anxiety presenting with SOB and chest tightness on 8/1/22 with Afib on presentation.     #HFrEF (EF: 20-25%) likely tachy- mediated   LHC: non-obstructive CAD  - C/w Furosemide 40 mg IV BID (Goal Net Negative: 1-2L)  - C/w Entresto 24/26 mg BID   - C/w Aldactone 25 mg QD   - C/w Metoprolol succinate 100 mg   - Monitor strict I/O's, Daily weights    #CAD; non-obstructive  - C/w aspirin 81 mg, atorvastatin 40 mg      #New Onset Atrial Fibrillation   - C/w apixaban and metoprolol   - C/w amiodarone   - EP following, appreciate recs

## 2022-08-05 NOTE — DISCHARGE NOTE PROVIDER - HOSPITAL COURSE
HPI:  58 yo M with no significant PMhx PRESENTED TO [facility] on 8/1  see H&P below from GC :      57M with hx of PVCs, tinnitus and anxiety, who presents for shortness of breath x 5 days, worse with exertion, gradual onset. However, today, was associated with new onset chest pain, described as "tight", 8/10, substernal, non-radiating, + diaphoretic, but WITHOUT headache, N/V, arm pain, jaw pain. Patient states he did heavy weightlifting 3 days ago. Also, travelled to [facility] 3 weeks ago (was on a plane ride). Does have chronic leg swelling x 4 months - had "extensive" testing by vascular surgery (Dr. Tidwell) - all testing negative, including arterial dopplers, vein mapping and ultrasound. No calf pain.   (end of copied text)  He was found to be in Afib and was given cardizem and started on metoprolol. He was started on eliquis . He was given lasix which relieved his chest tightness AND ENTRESTO was also started at [facility].     Patient had an ECHO on 8/1  that revealed :     1.   Technically difficult study with poor endocardial visualization.   2. Assessment of left ventricle regional wall motion and left ventricle   ejection fraction is limited due to poor endocardial visualization.   Consider use of IV echo contrast to better evaluate endocardium, if   clinically indicated. The left ventricle systolic function appears at   least moderately reduced, estimated LVEF    40%.   3. Normal left ventricular internal cavity size.   4. Increased LV wall thickness.   5. Possible moderator band visualized in the right ventricle.   6. Mild mitral annular calcification.   7. There appears to be some degree of mitral regurgitation, further   assessment limited by poor visualization of mitral valve leaflets.   8. The aortic valve leaflets are not well visualized, appears to have   calcification. Suboptimal Doppler assessments of aortic valve velocities   limits further evaluation.   9. The pericardium was not well visualized.      8/1 CTPA - negative for PE .     No fever or chills, no N/V/D or abd pain . He was transferred here for cardiac catheterization .  (02 Aug 2022 11:45)    8/2 diagnostic cardiac cath via right radial access, site without swelling, bleeding.

## 2022-08-05 NOTE — DISCHARGE NOTE PROVIDER - CARE PROVIDERS DIRECT ADDRESSES
[email or web address]

## 2022-08-05 NOTE — DISCHARGE NOTE PROVIDER - NSDCCPTREATMENT_GEN_ALL_CORE_FT
PRINCIPAL PROCEDURE  Procedure: Transesophageal echocardiography with cardioversion  Findings and Treatment: SHRADDHA with cardioversion

## 2022-08-09 RX ORDER — AMIODARONE HYDROCHLORIDE 400 MG/1
1 TABLET ORAL
Qty: 90 | Refills: 1
Start: 2022-08-09 | End: 2023-02-04

## 2022-08-17 ENCOUNTER — NON-APPOINTMENT (OUTPATIENT)
Age: 57
End: 2022-08-17

## 2022-08-17 ENCOUNTER — APPOINTMENT (OUTPATIENT)
Dept: CARDIOLOGY | Facility: CLINIC | Age: 57
End: 2022-08-17

## 2022-08-17 VITALS
WEIGHT: 315 LBS | BODY MASS INDEX: 37.19 KG/M2 | TEMPERATURE: 97.5 F | RESPIRATION RATE: 16 BRPM | HEART RATE: 59 BPM | OXYGEN SATURATION: 98 % | HEIGHT: 77 IN

## 2022-08-17 VITALS — DIASTOLIC BLOOD PRESSURE: 70 MMHG | HEART RATE: 60 BPM | SYSTOLIC BLOOD PRESSURE: 108 MMHG

## 2022-08-17 DIAGNOSIS — Z00.00 ENCOUNTER FOR GENERAL ADULT MEDICAL EXAMINATION W/OUT ABNORMAL FINDINGS: ICD-10-CM

## 2022-08-17 PROCEDURE — 93000 ELECTROCARDIOGRAM COMPLETE: CPT

## 2022-08-17 PROCEDURE — 99215 OFFICE O/P EST HI 40 MIN: CPT | Mod: 25

## 2022-08-17 RX ORDER — HYDROCHLOROTHIAZIDE 25 MG/1
25 TABLET ORAL
Qty: 30 | Refills: 0 | Status: DISCONTINUED | COMMUNITY
Start: 2022-06-09

## 2022-08-17 RX ORDER — ESCITALOPRAM OXALATE 20 MG/1
20 TABLET ORAL
Qty: 30 | Refills: 0 | Status: DISCONTINUED | COMMUNITY
Start: 2022-07-18

## 2022-08-17 RX ORDER — ESCITALOPRAM OXALATE 10 MG/1
10 TABLET, FILM COATED ORAL
Refills: 0 | Status: DISCONTINUED | COMMUNITY
Start: 2018-07-03 | End: 2022-08-17

## 2022-08-17 RX ORDER — METOPROLOL SUCCINATE 50 MG/1
50 TABLET, EXTENDED RELEASE ORAL
Qty: 90 | Refills: 0 | Status: DISCONTINUED | COMMUNITY
Start: 2022-07-18

## 2022-08-17 RX ORDER — ATORVASTATIN CALCIUM 40 MG/1
40 TABLET, FILM COATED ORAL
Qty: 30 | Refills: 0 | Status: DISCONTINUED | COMMUNITY
Start: 2022-08-05 | End: 2022-08-17

## 2022-08-17 RX ORDER — METOPROLOL SUCCINATE 100 MG/1
100 TABLET, EXTENDED RELEASE ORAL
Qty: 90 | Refills: 0 | Status: DISCONTINUED | COMMUNITY
Start: 2022-04-13

## 2022-08-17 NOTE — ASSESSMENT
[FreeTextEntry1] : In summary, the patient is a 57-year-old man with what appears to have been a tachycardia induced cardiomyopathy. At this time he is back in sinus rhythm and on appropriate medical therapy. Of course there is a possibility that he developed a cardiomyopathy which then led to the atrial fibrillation. It is possible that his chronic PVCs may have played a roll over he has had them for over 30 years and prior echoes have always been normal.\par \par For now I have had an extensive discussion with the patient and his wife. Given his normal coronary arteries and complex medical regimen, for now at least I have discontinued his lipid-lowering therapy. I have done this to avoid any drug drug interactions. I will discontinue his aspirin as well. We will decrease his Lasix to 20 mg per day.\par \par Patient will followup in one month's time. He will have a renal profile next week. We plan on repeat echocardiography in 3 months and if LV function has returned to normal might consider alteration in medical therapy on the assumption that this was indeed a tachycardia induced myopathy. Patient will consider getting some type of monitoring unit via apple watch or Wordseyele device to assess whether or not he was back into atrial fibrillation since he did not have classic symptoms of same

## 2022-08-17 NOTE — REASON FOR VISIT
[Arrhythmia/ECG Abnorrmalities] : arrhythmia/ECG abnormalities [Structural Heart and Valve Disease] : structural heart and valve disease [FreeTextEntry1] : Patient presents for reevaluation following recent hospitalization. Apparently may he has not been doing well. He initially developed leg swelling followed by increasing shortness of breath. He ultimately went to the hospital and was found in atrial fibrillation with rapid ventricular response along with congestive heart failure. Echocardiography at that time revealed an ejection fraction of approximately 40%. He ultimately was transferred to [facility] and hasn't where he was found to have normal coronary arteries. He had a trans-esophageal echo prior to cardioversion and the EF on SHRADDHA was listed as 22%. He underwent successful cardioversion. He is on multiple medications including anticoagulation goal directed medical therapy for heart failure along with a statin aspirin and Apixaban. This time he feels well and has no complaints of chest discomfort shortness of breath palpitations dizziness or syncope.

## 2022-09-16 ENCOUNTER — APPOINTMENT (OUTPATIENT)
Dept: ELECTROPHYSIOLOGY | Facility: CLINIC | Age: 57
End: 2022-09-16

## 2022-09-16 ENCOUNTER — NON-APPOINTMENT (OUTPATIENT)
Age: 57
End: 2022-09-16

## 2022-09-16 VITALS
OXYGEN SATURATION: 99 % | HEIGHT: 77 IN | WEIGHT: 315 LBS | DIASTOLIC BLOOD PRESSURE: 80 MMHG | HEART RATE: 67 BPM | BODY MASS INDEX: 37.19 KG/M2 | RESPIRATION RATE: 14 BRPM | SYSTOLIC BLOOD PRESSURE: 115 MMHG

## 2022-09-16 PROCEDURE — 99215 OFFICE O/P EST HI 40 MIN: CPT | Mod: 25

## 2022-09-16 PROCEDURE — 93000 ELECTROCARDIOGRAM COMPLETE: CPT

## 2022-09-16 RX ORDER — ALPRAZOLAM 0.5 MG/1
0.5 TABLET ORAL 3 TIMES DAILY
Qty: 30 | Refills: 0 | Status: ACTIVE | COMMUNITY
Start: 2022-07-19

## 2022-09-16 NOTE — DISCUSSION/SUMMARY
[FreeTextEntry1] : Hopefully his EF has normalized or is approaching normal. He should have a follow up echo in the next few weeks. He should stay on current medications. He will likely need an ablation long term to decrease risk of recurrent AF; however, if his LVEF has recovered, we can substitute amiodarone with an alternative drug with less systemic toxicities.  He has nonobstructive CAD so if EF is normal, we an consider flecainide, dronedarone, sotalol or dofetilide. since he is still reluctant to have medications, High risk medication amiodarone and its pulmonary, ocular, thyroid and hepatic side effects as well as photosensitivity, discussed with patient and will be monitored if he continues on it. ECG shows sinus with an RVCD. [EKG obtained to assist in diagnosis and management of assessed problem(s)] : EKG obtained to assist in diagnosis and management of assessed problem(s)

## 2022-09-16 NOTE — END OF VISIT
[FreeTextEntry3] : I personally performed the history and physical exam and formulated the medical decision making in this patient. I was responsible for more than 50% of the work of this encounter.\par  [Time Spent: ___ minutes] : I have spent [unfilled] minutes of time on the encounter. [>50% of the face to face encounter time was spent on counseling and/or coordination of care for ___] : Greater than 50% of the face to face encounter time was spent on counseling and/or coordination of care for [unfilled]

## 2022-09-16 NOTE — PHYSICAL EXAM
[Well Developed] : well developed [Well Nourished] : well nourished [No Acute Distress] : no acute distress [Normal Conjunctiva] : normal conjunctiva [Normal Venous Pressure] : normal venous pressure [No Carotid Bruit] : no carotid bruit [Normal S1, S2] : normal S1, S2 [No Murmur] : no murmur [No Rub] : no rub [No Gallop] : no gallop [Clear Lung Fields] : clear lung fields [Good Air Entry] : good air entry [No Respiratory Distress] : no respiratory distress  [Soft] : abdomen soft [Non Tender] : non-tender [No Masses/organomegaly] : no masses/organomegaly [Normal Bowel Sounds] : normal bowel sounds [Normal Gait] : normal gait [No Edema] : no edema [No Cyanosis] : no cyanosis [No Clubbing] : no clubbing [No Varicosities] : no varicosities [No Rash] : no rash [No Skin Lesions] : no skin lesions [Moves all extremities] : moves all extremities [No Focal Deficits] : no focal deficits [Normal Speech] : normal speech [Alert and Oriented] : alert and oriented [Normal memory] : normal memory [de-identified] : elevated BMI [de-identified] : Mildly reduced right carotid upstroke

## 2022-09-16 NOTE — HISTORY OF PRESENT ILLNESS
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  He also had 3 months of new peripheral edema.  TTE with EF ~20%.  He was started on eliquis (5mg BID), metoprolol succinate (100mg daily), and loaded on amiodarone after SHRADDHA DCCV (8/3).  LHC with non-obstructive triple vessel disease.  CTA negative.  TSH WNL.  \par \par He has been feeling well since, without recurrence of AF.  ECG shows sinus.  No family history of atrial fibrillation.  He drinks a cup of coffee daily, and an occasional glass of wine with dinner.  He has not undergone ablation and is reluctant to do so. He is tolerating amiodarone so far. LVEF was 20%  in AF with RVR. No follow up echo yet.\par \par \par

## 2022-09-19 ENCOUNTER — NON-APPOINTMENT (OUTPATIENT)
Age: 57
End: 2022-09-19

## 2022-09-19 ENCOUNTER — APPOINTMENT (OUTPATIENT)
Dept: CARDIOLOGY | Facility: CLINIC | Age: 57
End: 2022-09-19

## 2022-09-19 VITALS
HEIGHT: 77 IN | HEART RATE: 56 BPM | TEMPERATURE: 97 F | RESPIRATION RATE: 16 BRPM | BODY MASS INDEX: 37.19 KG/M2 | OXYGEN SATURATION: 95 % | WEIGHT: 315 LBS

## 2022-09-19 VITALS — SYSTOLIC BLOOD PRESSURE: 120 MMHG | DIASTOLIC BLOOD PRESSURE: 80 MMHG

## 2022-09-19 PROCEDURE — 93000 ELECTROCARDIOGRAM COMPLETE: CPT

## 2022-09-19 PROCEDURE — 99215 OFFICE O/P EST HI 40 MIN: CPT | Mod: 25

## 2022-09-19 RX ORDER — FUROSEMIDE 40 MG/1
40 TABLET ORAL
Refills: 0 | Status: DISCONTINUED | COMMUNITY
Start: 2022-08-05 | End: 2022-09-19

## 2022-09-19 NOTE — ASSESSMENT
[FreeTextEntry1] : Impression:\par 1.  Probable tachycardia induced cardiomyopathy.\par \par Plan:\par 1.  We have had an extensive discussion.  The patient is anxious to come off medications.  We have discussed goal-directed medical therapy.  We have discussed the issue of whether or not the patient's cardiomyopathy caused the A. fib or vice versa the current working diagnosis is that it most probably was caused by the A. fib and that if indeed his repeat echocardiogram after 3 months is normal we will discuss which medications can or should be discontinued.  For now would like to continue amiodarone as well until that time.  Will discontinue furosemide.  I have also discussed testosterone replacement which the patient has been getting and have told him to hold on this for now

## 2022-09-19 NOTE — REASON FOR VISIT
[Arrhythmia/ECG Abnorrmalities] : arrhythmia/ECG abnormalities [Structural Heart and Valve Disease] : structural heart and valve disease [FreeTextEntry1] : Patient returns for followup. Feeling well. Offers no complaints of chest discomfort shortness of breath palpitations dizziness or syncope.  He has gotten a Kardia normal device and thus far has been unable to demonstrate any atrial fibrillation.  He is extremely anxious to get off medications.  He brings his cath report with him to review with me which we did.\par

## 2022-11-15 ENCOUNTER — APPOINTMENT (OUTPATIENT)
Dept: CARDIOLOGY | Facility: CLINIC | Age: 57
End: 2022-11-15

## 2022-11-15 ENCOUNTER — NON-APPOINTMENT (OUTPATIENT)
Age: 57
End: 2022-11-15

## 2022-11-15 VITALS
HEIGHT: 77 IN | TEMPERATURE: 96.3 F | OXYGEN SATURATION: 94 % | WEIGHT: 315 LBS | HEART RATE: 65 BPM | RESPIRATION RATE: 18 BRPM | BODY MASS INDEX: 37.19 KG/M2

## 2022-11-15 VITALS — SYSTOLIC BLOOD PRESSURE: 110 MMHG | DIASTOLIC BLOOD PRESSURE: 80 MMHG

## 2022-11-15 DIAGNOSIS — I49.3 VENTRICULAR PREMATURE DEPOLARIZATION: ICD-10-CM

## 2022-11-15 DIAGNOSIS — I42.8 OTHER CARDIOMYOPATHIES: ICD-10-CM

## 2022-11-15 PROCEDURE — 99215 OFFICE O/P EST HI 40 MIN: CPT | Mod: 25

## 2022-11-15 PROCEDURE — 93000 ELECTROCARDIOGRAM COMPLETE: CPT

## 2022-11-15 PROCEDURE — 93306 TTE W/DOPPLER COMPLETE: CPT

## 2022-11-15 RX ORDER — AMIODARONE HYDROCHLORIDE 200 MG/1
200 TABLET ORAL
Qty: 90 | Refills: 0 | Status: DISCONTINUED | COMMUNITY
Start: 2022-08-05 | End: 2022-11-15

## 2022-11-15 NOTE — ASSESSMENT
[FreeTextEntry1] : Impression:\par 1.  Patient with probable atrial fibrillation/tachycardia induced cardiomyopathy now with LV function back to normal.  Current symptomatology may be due to amiodarone or could be something else entirely\par \par Plan:\par 1.  For now discontinue amiodarone\par 2.  Full blood work including thyroid functions to assess for amiodarone induced hyperthyroidism as an etiology of patient's tremor\par 3.  We will reassess after blood work\par 4.  Have discussed the fact that patient may have recurrence of atrial fibrillation.  He will carefully monitor himself so that if indeed it does recur he is not needed for that long and at that time ablation could be considered

## 2022-11-15 NOTE — REASON FOR VISIT
[Symptom and Test Evaluation] : symptom and test evaluation [Arrhythmia/ECG Abnorrmalities] : arrhythmia/ECG abnormalities [FreeTextEntry1] : Patient returns for follow-up.  For several weeks now at least the patient has had some funny feelings in his chest and jitteriness with his hands shaking.  He states he is checked his heart rhythm and it is always been normal.  He underwent echocardiography today which revealed normal left ventricular systolic function

## 2022-12-05 NOTE — H&P CARDIOLOGY - PATIENT'S GENDER IDENTITY
Male Mixed Superficial And Nodular Bcc Histology Text: There were numerous aggregates of basaloid cells demonstrating a nodular and superficial pattern.

## 2023-01-17 ENCOUNTER — NON-APPOINTMENT (OUTPATIENT)
Age: 58
End: 2023-01-17

## 2023-01-17 ENCOUNTER — APPOINTMENT (OUTPATIENT)
Dept: CARDIOLOGY | Facility: CLINIC | Age: 58
End: 2023-01-17
Payer: COMMERCIAL

## 2023-01-17 VITALS
OXYGEN SATURATION: 96 % | HEIGHT: 77 IN | RESPIRATION RATE: 16 BRPM | BODY MASS INDEX: 37.19 KG/M2 | HEART RATE: 75 BPM | WEIGHT: 315 LBS

## 2023-01-17 VITALS — HEART RATE: 68 BPM | DIASTOLIC BLOOD PRESSURE: 80 MMHG | SYSTOLIC BLOOD PRESSURE: 110 MMHG

## 2023-01-17 PROCEDURE — 99215 OFFICE O/P EST HI 40 MIN: CPT | Mod: 25

## 2023-01-17 PROCEDURE — 93000 ELECTROCARDIOGRAM COMPLETE: CPT

## 2023-01-17 NOTE — ASSESSMENT
Problem List Items Addressed This Visit     Hx of preeclampsia, prior pregnancy, currently pregnant    Encounter for supervision of other normal pregnancy, second trimester - Primary    Rh negative state in antepartum period    Need for Tdap vaccination    Relevant Orders    TDAP VACCINE GREATER THAN OR EQUAL TO 6YO IM (Completed)    Flu vaccine need    Relevant Orders    SYRINGE/SINGLE-DOSE VIAL: influenza vaccine, [id number], quadrivalent, 0 5 mL, preservative-free, for patients 3+ yr (FLUZONE) (Completed)        Feels well  Denies LOF/CTX/VB  No concerns  Discussed fetal kick counting  28 wk labs not done yet-will go soon  Tdap/Flu today  [place] at 28 wks  28 w FG on 12/4  [FreeTextEntry1] : Imp\par \par PATIENT WITH PRESUMED TACHYCARDIA INDUCED CMP WITH CAD ALBET NON OBSTRUCTIVE\par \par SUGGEST\par \par ADD ATORVASTATIN 20 MG OD\par LIPID LIVER PROFILES AND CPK IN 2 MONTHS TIME\par GIVEN FACT THAT CMP MAY HAVE BEEN TACHYCARDIA INDUCED MIGHT CONSIDER DISCONTINUation of entresto and aldactone at some time in the future

## 2023-01-17 NOTE — REASON FOR VISIT
[Arrhythmia/ECG Abnorrmalities] : arrhythmia/ECG abnormalities [Structural Heart and Valve Disease] : structural heart and valve disease [FreeTextEntry1] : Patient returns for followup. Feeling well. Offers no complaints of chest discomfort shortness of breath palpitations dizziness or syncope. He monitors his hr from time to time and has found no arrythmia. His tremors resolved after several weeks off amiodarone.His most recent lipid profile reveals total cholesterol 194 hdl 31 ldl 136. He is inquiring about medications.\par

## 2023-01-23 ENCOUNTER — RX RENEWAL (OUTPATIENT)
Age: 58
End: 2023-01-23

## 2023-01-25 ENCOUNTER — RX RENEWAL (OUTPATIENT)
Age: 58
End: 2023-01-25

## 2023-02-21 ENCOUNTER — RX RENEWAL (OUTPATIENT)
Age: 58
End: 2023-02-21

## 2023-05-15 ENCOUNTER — NON-APPOINTMENT (OUTPATIENT)
Age: 58
End: 2023-05-15

## 2023-05-15 ENCOUNTER — APPOINTMENT (OUTPATIENT)
Dept: CARDIOLOGY | Facility: CLINIC | Age: 58
End: 2023-05-15
Payer: COMMERCIAL

## 2023-05-15 VITALS — HEART RATE: 64 BPM | DIASTOLIC BLOOD PRESSURE: 88 MMHG | SYSTOLIC BLOOD PRESSURE: 116 MMHG

## 2023-05-15 VITALS — HEIGHT: 77 IN | HEART RATE: 67 BPM | BODY MASS INDEX: 37.19 KG/M2 | OXYGEN SATURATION: 98 % | WEIGHT: 315 LBS

## 2023-05-15 DIAGNOSIS — I25.10 ATHEROSCLEROTIC HEART DISEASE OF NATIVE CORONARY ARTERY W/OUT ANGINA PECTORIS: ICD-10-CM

## 2023-05-15 PROCEDURE — 99214 OFFICE O/P EST MOD 30 MIN: CPT | Mod: 25

## 2023-05-15 PROCEDURE — 93000 ELECTROCARDIOGRAM COMPLETE: CPT

## 2023-05-15 RX ORDER — SPIRONOLACTONE 25 MG/1
25 TABLET ORAL DAILY
Qty: 90 | Refills: 0 | Status: DISCONTINUED | COMMUNITY
Start: 2022-08-05 | End: 2023-05-15

## 2023-05-15 RX ORDER — SACUBITRIL AND VALSARTAN 24; 26 MG/1; MG/1
24-26 TABLET, FILM COATED ORAL TWICE DAILY
Qty: 180 | Refills: 2 | Status: DISCONTINUED | COMMUNITY
Start: 2022-08-05 | End: 2023-05-15

## 2023-05-15 NOTE — ASSESSMENT
[FreeTextEntry1] : Pression:\par 1.  Patient with presumed tachycardia induced cardiomyopathy now back in sinus rhythm with restoration of his LV systolic function.\par 2.  Nonobstructive coronary artery disease\par \par Plan:\par 1.  For now we have discussed future management not entirely unreasonable to discontinue Entresto and spironolactone patient understands the potential risks and benefits\par 2.  Continue beta-blocker and statin\par 3.  Return for follow-up in 6 months time and repeat echocardiogram to make sure that LV function remains restored.\par 4.  Patient continues to self monitor for atrial fibrillation

## 2023-05-15 NOTE — REASON FOR VISIT
[Arrhythmia/ECG Abnorrmalities] : arrhythmia/ECG abnormalities [Structural Heart and Valve Disease] : structural heart and valve disease [Coronary Artery Disease] : coronary artery disease [FreeTextEntry1] : Patient returns for followup. Feeling well. Offers no complaints of chest discomfort shortness of breath palpitations dizziness or syncope.  He discontinued both his Entresto and spironolactone when the prescriptions  1 week ago\par

## 2023-06-19 NOTE — H&P PST ADULT - NSICDXPROBLEM_GEN_ALL_CORE_FT
PROBLEM DIAGNOSES  Problem: Ventral hernia  Assessment and Plan: 56 yo malei s scheduled for ventral hernia repair on 12/3/2020.  medical clearance is requested.  Pre op instructions were reviewed and signed.  Best wishes offered.         Azithromycin Pregnancy And Lactation Text: This medication is considered safe during pregnancy and is also secreted in breast milk.

## 2023-08-18 NOTE — H&P PST ADULT - DOCUMENT STATUS
HPI    Dr. Clemens     Type 2 diabetes mellitus with diabetic polyneuropathy, with long-term   current use of insulin  Type 2 diabetes mellitus with hyperglycemia, without long-term current use   of insulin  Vitreomacular adhesion, right  Epiretinal membrane, left  MARLON on CPAP  Conjunctivitis, allergic, bilateral  Dry eye syndrome, bilateral  Pseudophakia of both eyes  PCO (posterior capsular opacification), bilateral    GTTS:  Ivizia QHS OU  Pataday QAM OU    Pt is here today for yag. Pt states that vision is blurry. Pt denies eye   pain.   Last edited by Alycia Cintron MA on 8/18/2023 11:01 AM.            Assessment /Plan     For exam results, see Encounter Report.    Posterior capsular opacification visually significant, right eye  -     Yag Capsulotomy - OD - Right Eye  -     Yag Capsulotomy - OS - Left Eye    Posterior capsular opacification visually significant, left eye  -     Yag Capsulotomy - OD - Right Eye  -     Yag Capsulotomy - OS - Left Eye      Visually significant posterior capsular opacity present.  OU  - discussed risks, benefits, and alternatives to laser surgery - pt wishes to proceed with yag laser  - Informed consent obtained and correct eye(s) verified with patient.  - Intraocular Pressure to be taken 10- 30 minutes post procedure.   - PF QID x 4 days then d/c  - f/up as scheduled    DIAGNOSIS: Visually significant posterior capsular opacity    PROCEDURE: YAG Laser Capsulotomy OU    COMPLICATIONS: none     DESCRIPTION OF PROCEDURE IN DETAIL:  1 drop of topical Proparacaine and Iopidine instilled, and eye(s) dilated with 1% Tropicamide 2.5% Phenylephrine. YAG laser applied to posterior capsule in cruciate pattern.      DISPOSITION:  Patient tolerated procedure well.      Will call pt next wk to ensure doing well s/p yag cap    F/up optom REE    Vitreomacular adhesion, right  Epiretinal membrane, left    May ultimately limit BCVA OU                  Authored by Resident/PA/NP

## 2023-08-24 NOTE — PATIENT PROFILE ADULT - FALL HARM RISK - ATTEMPT OOB
After 2-3 days as pain improves use 5 drops of debrox in your ear 2 x a day for 3 days    Return to clinic if symptoms are not improving  
No

## 2023-08-28 ENCOUNTER — RX RENEWAL (OUTPATIENT)
Age: 58
End: 2023-08-28

## 2023-08-28 RX ORDER — APIXABAN 5 MG/1
5 TABLET, FILM COATED ORAL
Qty: 180 | Refills: 3 | Status: ACTIVE | COMMUNITY
Start: 2022-08-05 | End: 1900-01-01

## 2023-11-20 ENCOUNTER — NON-APPOINTMENT (OUTPATIENT)
Age: 58
End: 2023-11-20

## 2023-11-20 ENCOUNTER — APPOINTMENT (OUTPATIENT)
Dept: CARDIOLOGY | Facility: CLINIC | Age: 58
End: 2023-11-20
Payer: COMMERCIAL

## 2023-11-20 VITALS — DIASTOLIC BLOOD PRESSURE: 80 MMHG | SYSTOLIC BLOOD PRESSURE: 120 MMHG

## 2023-11-20 VITALS
OXYGEN SATURATION: 98 % | HEART RATE: 61 BPM | BODY MASS INDEX: 36.96 KG/M2 | HEIGHT: 77 IN | RESPIRATION RATE: 17 BRPM | WEIGHT: 313 LBS

## 2023-11-20 DIAGNOSIS — I48.0 PAROXYSMAL ATRIAL FIBRILLATION: ICD-10-CM

## 2023-11-20 DIAGNOSIS — R00.0 TACHYCARDIA, UNSPECIFIED: ICD-10-CM

## 2023-11-20 DIAGNOSIS — I43 TACHYCARDIA, UNSPECIFIED: ICD-10-CM

## 2023-11-20 PROCEDURE — 99215 OFFICE O/P EST HI 40 MIN: CPT | Mod: 25

## 2023-11-20 PROCEDURE — 93306 TTE W/DOPPLER COMPLETE: CPT

## 2023-11-20 PROCEDURE — 93000 ELECTROCARDIOGRAM COMPLETE: CPT

## 2024-01-02 ENCOUNTER — RX RENEWAL (OUTPATIENT)
Age: 59
End: 2024-01-02

## 2024-01-16 RX ORDER — METOPROLOL SUCCINATE 100 MG/1
100 TABLET, EXTENDED RELEASE ORAL DAILY
Qty: 90 | Refills: 0 | Status: ACTIVE | COMMUNITY
Start: 2020-12-23 | End: 1900-01-01

## 2024-04-03 NOTE — ED PROVIDER NOTE - NSICDXPASTMEDICALHX_GEN_ALL_CORE_FT
LOV 3/21/24   RTO 4/10/24  LRF 9/29/23          Controlled Substance Monitoring:    Acute and Chronic Pain Monitoring:   RX Monitoring Periodic Controlled Substance Monitoring   8/30/2023  11:17 AM No signs of potential drug abuse or diversion identified.;Random urine drug screen sent today.            
PAST MEDICAL HISTORY:  At risk for sleep apnea     Cardiac arrhythmia     Class 2 obesity with body mass index (BMI) of 38.0 to 38.9 in adult     Frequent PVCs     GERD (gastroesophageal reflux disease)     Tinnitus     Ventral hernia

## 2024-04-16 RX ORDER — ATORVASTATIN CALCIUM 20 MG/1
20 TABLET, FILM COATED ORAL DAILY
Qty: 90 | Refills: 3 | Status: DISCONTINUED | COMMUNITY
Start: 2023-01-17 | End: 2024-04-16

## 2024-04-16 RX ORDER — ROSUVASTATIN CALCIUM 5 MG/1
5 TABLET, FILM COATED ORAL
Qty: 90 | Refills: 3 | Status: ACTIVE | COMMUNITY
Start: 2024-04-16 | End: 1900-01-01

## 2024-05-12 ENCOUNTER — EMERGENCY (EMERGENCY)
Facility: HOSPITAL | Age: 59
LOS: 1 days | Discharge: HOME | End: 2024-05-12
Attending: STUDENT IN AN ORGANIZED HEALTH CARE EDUCATION/TRAINING PROGRAM | Admitting: STUDENT IN AN ORGANIZED HEALTH CARE EDUCATION/TRAINING PROGRAM
Payer: COMMERCIAL

## 2024-05-12 VITALS
HEART RATE: 76 BPM | TEMPERATURE: 97 F | OXYGEN SATURATION: 96 % | WEIGHT: 315 LBS | HEIGHT: 77 IN | SYSTOLIC BLOOD PRESSURE: 138 MMHG | DIASTOLIC BLOOD PRESSURE: 81 MMHG | RESPIRATION RATE: 16 BRPM

## 2024-05-12 DIAGNOSIS — Z98.890 OTHER SPECIFIED POSTPROCEDURAL STATES: Chronic | ICD-10-CM

## 2024-05-12 PROCEDURE — 99283 EMERGENCY DEPT VISIT LOW MDM: CPT

## 2024-05-12 RX ORDER — TRAMADOL HYDROCHLORIDE 50 MG/1
1 TABLET ORAL
Qty: 9 | Refills: 0
Start: 2024-05-12 | End: 2024-05-14

## 2024-05-12 RX ORDER — TRAMADOL HYDROCHLORIDE 50 MG/1
50 TABLET ORAL ONCE
Refills: 0 | Status: DISCONTINUED | OUTPATIENT
Start: 2024-05-12 | End: 2024-05-12

## 2024-05-12 RX ADMIN — TRAMADOL HYDROCHLORIDE 50 MILLIGRAM(S): 50 TABLET ORAL at 07:44

## 2024-05-12 NOTE — ED PROVIDER NOTE - OBJECTIVE STATEMENT
59M pmhx of HTN presenting with left elbow pain x 2 days. Describes onset of mild left elbow swelling, mild pain, exacerbated w/ positioning. Denies any weakness, numbness/tingling, fevers, chills, rashes, falls, trauma, chest pain, shortness of breath, abdominal pain.

## 2024-05-12 NOTE — ED PROVIDER NOTE - CLINICAL SUMMARY MEDICAL DECISION MAKING FREE TEXT BOX
Pt w/ no signficant PMHx presenting with LEFT elbow atrumatic pain x 2 days. Exam and history concerning for musculoskeletal pain , doubt fracture. There is no evidence of deformity or joint instability. There are no open wounds overlying the affected site.   DDX: Rule out associated traumatic injuries, abrasions, lacerations, head trauma, neck trauma, open fractures.  PLAN:   -analgesia, ice packs & re-assess  - orthopedic consultation if needed   - f/u pmd.

## 2024-05-12 NOTE — ED PROVIDER NOTE - NSFOLLOWUPINSTRUCTIONS_ED_ALL_ED_FT
Sprain    A sprain is a stretch or tear in one of the tough, fiber-like tissues (ligaments) in your body. This is caused by an injury to the area such as a twisting mechanism. Symptoms include pain, swelling, or bruising. Rest that area over the next several days and slowly resume activity when tolerated. Ice can help with swelling and pain.     SEEK IMMEDIATE MEDICAL CARE IF YOU HAVE ANY OF THE FOLLOWING SYMPTOMS: worsening pain, inability to move that body part, numbness or tingling.     Take acetaminophen 650 mg orally every 6-8 hours for pain control as needed. Please do not exceed 4,000 mg of acetaminophen during a 24 hours period. Acetaminophen can be found in many over-the-counter cold medications as well as opioid medications that may be given for pain.    Take ibuprofen (also known as MOTRIN or ADVIL) 400 mg orally every 6-8 hours for pain control as needed with food to avoid an upset stomach. Ibuprofen can be found in many over-the-counter medications. Please do not take ibuprofen if you have a bleeding disorder, stomach or gastrointestinal ulcer, or liver disease.    If needed, you can alternate these medications so that you can take one medication every 3 hours. For example, at noon take ibuprofen, then at 3PM take acetaminophen, then at 6PM take ibuprofen.

## 2024-05-12 NOTE — ED PROVIDER NOTE - PATIENT PORTAL LINK FT
You can access the FollowMyHealth Patient Portal offered by [facility] by registering at the following website: [email or web address]. By joining [facility]’s FollowMyHealth portal, you will also be able to view your health information using other applications (apps) compatible with our system.

## 2024-05-12 NOTE — ED PROVIDER NOTE - DISCHARGE DATE
Health Maintenance   Topic Date Due    HIV screen  Never done    Hepatitis C screen  Never done    DTaP/Tdap/Td vaccine (1 - Tdap) Never done    Shingles vaccine (1 of 2) Never done    Respiratory Syncytial Virus (RSV) Pregnant or age 60 yrs+ (1 - 1-dose 60+ series) Never done    Flu vaccine (1) 08/01/2023    COVID-19 Vaccine (4 - 2023-24 season) 09/01/2023    Depression Monitoring  01/10/2024    Colorectal Cancer Screen  02/08/2026    Breast cancer screen  03/04/2026    Lipids  03/07/2029    Hepatitis A vaccine  Aged Out    Hepatitis B vaccine  Aged Out    Hib vaccine  Aged Out    Polio vaccine  Aged Out    Meningococcal (ACWY) vaccine  Aged Out    Pneumococcal 0-64 years Vaccine  Aged Out             (applicable per patient's age: Cancer Screenings, Depression Screening, Fall Risk Screening, Immunizations)    LDL Cholesterol (mg/dL)   Date Value   03/07/2024 108 (H)     AST (U/L)   Date Value   03/07/2024 16     ALT (U/L)   Date Value   03/07/2024 10     BUN (mg/dL)   Date Value   03/07/2024 12      (goal A1C is < 7)   (goal LDL is <100) need 30-50% reduction from baseline     BP Readings from Last 3 Encounters:   01/10/23 118/78   07/09/21 108/76    (goal /80)      All Future Testing planned in [facility]:      Next Visit Date:  No future appointments.         Patient Active Problem List:     Acute depression     Atrial fibrillation (HCC)     Cardiac conduction disorder     Current smoker     Gastroesophageal reflux disease     Low back pain     Obesity with body mass index 30 or greater     Reactive airway disease     Simple obesity     12-May-2024

## 2024-05-12 NOTE — ED PROVIDER NOTE - PHYSICAL EXAMINATION
VITAL SIGNS: I have reviewed nursing notes and confirm.   GEN: Well-developed; well-nourished; in no acute distress. Speaking full sentences.  SKIN: Warm, pink, no rash, no diaphoresis, no cyanosis, well perfused.   HEAD: Normocephalic; atraumatic. No scalp lacerations, no abrasions.  NECK: Supple; non tender.   EYES: Pupils 3mm equal, round, reactive to light and accomodation, conjunctiva and sclera clear. Extra-ocular movements intact bilaterally.  ENT: No nasal discharge; airway clear. Trachea is midline. Normal dentition.  MSK: Normal range of motion and movement of all 4 extremities. No apparent joint or muscular pain throughout.  LEFT ARM: (+) mild elbow swelling, mild ttp at the olecranon, FROM passive/actively limited secondary to pain. distal pulses 2+ No erythema, no cellulitis, no red streaking  NEURO: Alert & oriented x 3, Grossly unremarkable. Sensory and motor intact throughout. No focal deficits. Gait: Fluid. Normal speech and coordination.

## 2024-06-28 NOTE — H&P CARDIOLOGY - NEUROLOGICAL
What Type Of Note Output Would You Prefer (Optional)?: Standard Output What Is The Reason For Today's Visit?: Full Body Skin Examination What Is The Reason For Today's Visit? (Being Monitored For X): concerning skin lesions on a periodic basis negative Alert & oriented; no sensory, motor or coordination deficits, normal reflexes

## 2024-07-10 ENCOUNTER — RX RENEWAL (OUTPATIENT)
Age: 59
End: 2024-07-10

## 2024-08-27 ENCOUNTER — APPOINTMENT (OUTPATIENT)
Dept: RADIOLOGY | Facility: HOSPITAL | Age: 59
End: 2024-08-27
Payer: COMMERCIAL

## 2024-08-27 ENCOUNTER — OUTPATIENT (OUTPATIENT)
Dept: OUTPATIENT SERVICES | Facility: HOSPITAL | Age: 59
LOS: 1 days | End: 2024-08-27
Payer: COMMERCIAL

## 2024-08-27 DIAGNOSIS — Z00.8 ENCOUNTER FOR OTHER GENERAL EXAMINATION: ICD-10-CM

## 2024-08-27 PROCEDURE — 72100 X-RAY EXAM L-S SPINE 2/3 VWS: CPT | Mod: 26

## 2024-08-27 PROCEDURE — 72070 X-RAY EXAM THORAC SPINE 2VWS: CPT | Mod: 26

## 2024-08-27 PROCEDURE — 72070 X-RAY EXAM THORAC SPINE 2VWS: CPT

## 2024-08-27 PROCEDURE — 72100 X-RAY EXAM L-S SPINE 2/3 VWS: CPT

## 2024-09-09 ENCOUNTER — RX RENEWAL (OUTPATIENT)
Age: 59
End: 2024-09-09

## 2024-11-21 ENCOUNTER — NON-APPOINTMENT (OUTPATIENT)
Age: 59
End: 2024-11-21

## 2024-11-21 ENCOUNTER — APPOINTMENT (OUTPATIENT)
Dept: CARDIOLOGY | Facility: CLINIC | Age: 59
End: 2024-11-21
Payer: COMMERCIAL

## 2024-11-21 VITALS
RESPIRATION RATE: 18 BRPM | BODY MASS INDEX: 36.45 KG/M2 | HEART RATE: 74 BPM | HEIGHT: 78 IN | DIASTOLIC BLOOD PRESSURE: 89 MMHG | SYSTOLIC BLOOD PRESSURE: 138 MMHG | OXYGEN SATURATION: 95 % | WEIGHT: 315 LBS

## 2024-11-21 VITALS — HEART RATE: 80 BPM | SYSTOLIC BLOOD PRESSURE: 110 MMHG | DIASTOLIC BLOOD PRESSURE: 84 MMHG

## 2024-11-21 PROCEDURE — 93000 ELECTROCARDIOGRAM COMPLETE: CPT

## 2024-11-21 PROCEDURE — 99214 OFFICE O/P EST MOD 30 MIN: CPT

## 2024-11-21 PROCEDURE — G2211 COMPLEX E/M VISIT ADD ON: CPT | Mod: NC

## 2025-04-09 ENCOUNTER — RX RENEWAL (OUTPATIENT)
Age: 60
End: 2025-04-09

## 2025-05-21 ENCOUNTER — EMERGENCY (EMERGENCY)
Facility: HOSPITAL | Age: 60
LOS: 1 days | End: 2025-05-21
Attending: EMERGENCY MEDICINE | Admitting: EMERGENCY MEDICINE
Payer: COMMERCIAL

## 2025-05-21 VITALS
OXYGEN SATURATION: 97 % | HEART RATE: 71 BPM | SYSTOLIC BLOOD PRESSURE: 135 MMHG | HEIGHT: 77 IN | RESPIRATION RATE: 18 BRPM | WEIGHT: 315 LBS | TEMPERATURE: 97 F | DIASTOLIC BLOOD PRESSURE: 92 MMHG

## 2025-05-21 DIAGNOSIS — Z98.890 OTHER SPECIFIED POSTPROCEDURAL STATES: Chronic | ICD-10-CM

## 2025-05-21 PROCEDURE — 96372 THER/PROPH/DIAG INJ SC/IM: CPT

## 2025-05-21 PROCEDURE — 99284 EMERGENCY DEPT VISIT MOD MDM: CPT

## 2025-05-21 PROCEDURE — 99284 EMERGENCY DEPT VISIT MOD MDM: CPT | Mod: 25

## 2025-05-21 RX ORDER — LIDOCAINE HYDROCHLORIDE 20 MG/ML
1 JELLY TOPICAL ONCE
Refills: 0 | Status: COMPLETED | OUTPATIENT
Start: 2025-05-21 | End: 2025-05-21

## 2025-05-21 RX ORDER — METHOCARBAMOL 500 MG/1
2 TABLET, FILM COATED ORAL
Qty: 50 | Refills: 0
Start: 2025-05-21

## 2025-05-21 RX ORDER — OXYCODONE HYDROCHLORIDE 30 MG/1
1 TABLET ORAL
Qty: 15 | Refills: 0
Start: 2025-05-21

## 2025-05-21 RX ORDER — METHOCARBAMOL 500 MG/1
1500 TABLET, FILM COATED ORAL ONCE
Refills: 0 | Status: COMPLETED | OUTPATIENT
Start: 2025-05-21 | End: 2025-05-21

## 2025-05-21 RX ORDER — IBUPROFEN 200 MG
1 TABLET ORAL
Qty: 30 | Refills: 0
Start: 2025-05-21

## 2025-05-21 RX ORDER — HYDROMORPHONE/SOD CHLOR,ISO/PF 2 MG/10 ML
1 SYRINGE (ML) INJECTION ONCE
Refills: 0 | Status: DISCONTINUED | OUTPATIENT
Start: 2025-05-21 | End: 2025-05-21

## 2025-05-21 RX ADMIN — METHOCARBAMOL 1500 MILLIGRAM(S): 500 TABLET, FILM COATED ORAL at 21:40

## 2025-05-21 RX ADMIN — Medication 1 MILLIGRAM(S): at 21:40

## 2025-05-21 RX ADMIN — Medication 1 MILLIGRAM(S): at 22:10

## 2025-05-21 RX ADMIN — LIDOCAINE HYDROCHLORIDE 1 PATCH: 20 JELLY TOPICAL at 21:40

## 2025-06-19 ENCOUNTER — RX RENEWAL (OUTPATIENT)
Age: 60
End: 2025-06-19

## 2025-07-28 NOTE — ED ADULT TRIAGE NOTE - BP NONINVASIVE DIASTOLIC (MM HG)
What Type Of Note Output Would You Prefer (Optional)?: Bullet Format How Severe Is Your Skin Lesion?: mild Has Your Skin Lesion Been Treated?: been treated Is This A New Presentation, Or A Follow-Up?: Skin Lesion 84